# Patient Record
Sex: FEMALE | Race: OTHER | HISPANIC OR LATINO | URBAN - METROPOLITAN AREA
[De-identification: names, ages, dates, MRNs, and addresses within clinical notes are randomized per-mention and may not be internally consistent; named-entity substitution may affect disease eponyms.]

---

## 2017-05-18 ENCOUNTER — EMERGENCY (EMERGENCY)
Facility: HOSPITAL | Age: 42
LOS: 0 days | Discharge: HOME | End: 2017-05-18
Admitting: INTERNAL MEDICINE

## 2017-06-28 DIAGNOSIS — S09.90XA UNSPECIFIED INJURY OF HEAD, INITIAL ENCOUNTER: ICD-10-CM
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
## 2017-06-28 DIAGNOSIS — Z88.0 ALLERGY STATUS TO PENICILLIN: ICD-10-CM
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
## 2017-06-28 DIAGNOSIS — R51 HEADACHE: ICD-10-CM

## 2017-06-28 DIAGNOSIS — S00.81XA ABRASION OF OTHER PART OF HEAD, INITIAL ENCOUNTER: ICD-10-CM
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
## 2017-12-18 ENCOUNTER — EMERGENCY (EMERGENCY)
Facility: HOSPITAL | Age: 42
LOS: 0 days | Discharge: HOME | End: 2017-12-19
Admitting: INTERNAL MEDICINE

## 2017-12-18 DIAGNOSIS — Z88.0 ALLERGY STATUS TO PENICILLIN: ICD-10-CM

## 2017-12-18 DIAGNOSIS — R19.7 DIARRHEA, UNSPECIFIED: ICD-10-CM

## 2017-12-18 DIAGNOSIS — R11.0 NAUSEA: ICD-10-CM

## 2017-12-18 DIAGNOSIS — Z72.0 TOBACCO USE: ICD-10-CM

## 2017-12-18 DIAGNOSIS — R63.0 ANOREXIA: ICD-10-CM

## 2017-12-18 DIAGNOSIS — Z98.891 HISTORY OF UTERINE SCAR FROM PREVIOUS SURGERY: ICD-10-CM

## 2017-12-18 DIAGNOSIS — R10.13 EPIGASTRIC PAIN: ICD-10-CM

## 2017-12-18 DIAGNOSIS — R10.32 LEFT LOWER QUADRANT PAIN: ICD-10-CM

## 2018-08-20 ENCOUNTER — OUTPATIENT (OUTPATIENT)
Dept: OUTPATIENT SERVICES | Facility: HOSPITAL | Age: 43
LOS: 1 days | Discharge: HOME | End: 2018-08-20

## 2018-08-20 DIAGNOSIS — R10.30 LOWER ABDOMINAL PAIN, UNSPECIFIED: ICD-10-CM

## 2018-08-23 ENCOUNTER — EMERGENCY (EMERGENCY)
Facility: HOSPITAL | Age: 43
LOS: 1 days | Discharge: HOME | End: 2018-08-23
Attending: EMERGENCY MEDICINE

## 2018-08-23 VITALS
RESPIRATION RATE: 16 BRPM | TEMPERATURE: 99 F | DIASTOLIC BLOOD PRESSURE: 95 MMHG | OXYGEN SATURATION: 99 % | WEIGHT: 225.09 LBS | HEART RATE: 96 BPM | HEIGHT: 64 IN | SYSTOLIC BLOOD PRESSURE: 194 MMHG

## 2018-08-23 VITALS — SYSTOLIC BLOOD PRESSURE: 117 MMHG | DIASTOLIC BLOOD PRESSURE: 61 MMHG | TEMPERATURE: 97 F | HEART RATE: 87 BPM

## 2018-08-23 DIAGNOSIS — Z98.891 HISTORY OF UTERINE SCAR FROM PREVIOUS SURGERY: ICD-10-CM

## 2018-08-23 DIAGNOSIS — N94.9 UNSPECIFIED CONDITION ASSOCIATED WITH FEMALE GENITAL ORGANS AND MENSTRUAL CYCLE: ICD-10-CM

## 2018-08-23 DIAGNOSIS — R10.9 UNSPECIFIED ABDOMINAL PAIN: ICD-10-CM

## 2018-08-23 DIAGNOSIS — R19.7 DIARRHEA, UNSPECIFIED: ICD-10-CM

## 2018-08-23 DIAGNOSIS — N83.8 OTHER NONINFLAMMATORY DISORDERS OF OVARY, FALLOPIAN TUBE AND BROAD LIGAMENT: ICD-10-CM

## 2018-08-23 DIAGNOSIS — F17.200 NICOTINE DEPENDENCE, UNSPECIFIED, UNCOMPLICATED: ICD-10-CM

## 2018-08-23 LAB
ALBUMIN SERPL ELPH-MCNC: 4.2 G/DL — SIGNIFICANT CHANGE UP (ref 3.5–5.2)
ALP SERPL-CCNC: 108 U/L — SIGNIFICANT CHANGE UP (ref 30–115)
ALT FLD-CCNC: 7 U/L — SIGNIFICANT CHANGE UP (ref 0–41)
ANION GAP SERPL CALC-SCNC: 16 MMOL/L — HIGH (ref 7–14)
APPEARANCE UR: ABNORMAL
APTT BLD: 33.3 SEC — SIGNIFICANT CHANGE UP (ref 27–39.2)
AST SERPL-CCNC: 11 U/L — SIGNIFICANT CHANGE UP (ref 0–41)
BACTERIA # UR AUTO: ABNORMAL /HPF
BILIRUB SERPL-MCNC: 0.8 MG/DL — SIGNIFICANT CHANGE UP (ref 0.2–1.2)
BILIRUB UR-MCNC: NEGATIVE — SIGNIFICANT CHANGE UP
BUN SERPL-MCNC: 7 MG/DL — LOW (ref 10–20)
CALCIUM SERPL-MCNC: 9.5 MG/DL — SIGNIFICANT CHANGE UP (ref 8.5–10.1)
CHLORIDE SERPL-SCNC: 99 MMOL/L — SIGNIFICANT CHANGE UP (ref 98–110)
CO2 SERPL-SCNC: 25 MMOL/L — SIGNIFICANT CHANGE UP (ref 17–32)
COLOR SPEC: YELLOW — SIGNIFICANT CHANGE UP
CREAT SERPL-MCNC: 0.6 MG/DL — LOW (ref 0.7–1.5)
DIFF PNL FLD: NEGATIVE — SIGNIFICANT CHANGE UP
EPI CELLS # UR: ABNORMAL /HPF
GLUCOSE SERPL-MCNC: 152 MG/DL — HIGH (ref 70–99)
GLUCOSE UR QL: NEGATIVE MG/DL — SIGNIFICANT CHANGE UP
HCT VFR BLD CALC: 37.1 % — SIGNIFICANT CHANGE UP (ref 37–47)
HGB BLD-MCNC: 12.5 G/DL — SIGNIFICANT CHANGE UP (ref 12–16)
INR BLD: 1.29 RATIO — SIGNIFICANT CHANGE UP (ref 0.65–1.3)
KETONES UR-MCNC: NEGATIVE — SIGNIFICANT CHANGE UP
LACTATE SERPL-SCNC: 1.4 MMOL/L — SIGNIFICANT CHANGE UP (ref 0.5–2.2)
LEUKOCYTE ESTERASE UR-ACNC: NEGATIVE — SIGNIFICANT CHANGE UP
LIDOCAIN IGE QN: 15 U/L — SIGNIFICANT CHANGE UP (ref 7–60)
MCHC RBC-ENTMCNC: 28.1 PG — SIGNIFICANT CHANGE UP (ref 27–31)
MCHC RBC-ENTMCNC: 33.7 G/DL — SIGNIFICANT CHANGE UP (ref 32–37)
MCV RBC AUTO: 83.4 FL — SIGNIFICANT CHANGE UP (ref 81–99)
NITRITE UR-MCNC: NEGATIVE — SIGNIFICANT CHANGE UP
NRBC # BLD: 0 /100 WBCS — SIGNIFICANT CHANGE UP (ref 0–0)
PH UR: 6.5 — SIGNIFICANT CHANGE UP (ref 5–8)
PLATELET # BLD AUTO: 354 K/UL — SIGNIFICANT CHANGE UP (ref 130–400)
POTASSIUM SERPL-MCNC: 4 MMOL/L — SIGNIFICANT CHANGE UP (ref 3.5–5)
POTASSIUM SERPL-SCNC: 4 MMOL/L — SIGNIFICANT CHANGE UP (ref 3.5–5)
PROT SERPL-MCNC: 7.4 G/DL — SIGNIFICANT CHANGE UP (ref 6–8)
PROT UR-MCNC: NEGATIVE MG/DL — SIGNIFICANT CHANGE UP
PROTHROM AB SERPL-ACNC: 13.9 SEC — HIGH (ref 9.95–12.87)
RBC # BLD: 4.45 M/UL — SIGNIFICANT CHANGE UP (ref 4.2–5.4)
RBC # FLD: 11.9 % — SIGNIFICANT CHANGE UP (ref 11.5–14.5)
SODIUM SERPL-SCNC: 140 MMOL/L — SIGNIFICANT CHANGE UP (ref 135–146)
SP GR SPEC: 1.01 — SIGNIFICANT CHANGE UP (ref 1.01–1.03)
TYPE + AB SCN PNL BLD: SIGNIFICANT CHANGE UP
UROBILINOGEN FLD QL: 1 MG/DL (ref 0.2–0.2)
WBC # BLD: 9.52 K/UL — SIGNIFICANT CHANGE UP (ref 4.8–10.8)
WBC # FLD AUTO: 9.52 K/UL — SIGNIFICANT CHANGE UP (ref 4.8–10.8)

## 2018-08-23 RX ORDER — SODIUM CHLORIDE 9 MG/ML
1000 INJECTION INTRAMUSCULAR; INTRAVENOUS; SUBCUTANEOUS ONCE
Qty: 0 | Refills: 0 | Status: COMPLETED | OUTPATIENT
Start: 2018-08-23 | End: 2018-08-23

## 2018-08-23 RX ORDER — SODIUM CHLORIDE 9 MG/ML
3 INJECTION INTRAMUSCULAR; INTRAVENOUS; SUBCUTANEOUS ONCE
Qty: 0 | Refills: 0 | Status: COMPLETED | OUTPATIENT
Start: 2018-08-23 | End: 2018-08-23

## 2018-08-23 RX ADMIN — SODIUM CHLORIDE 3 MILLILITER(S): 9 INJECTION INTRAMUSCULAR; INTRAVENOUS; SUBCUTANEOUS at 06:44

## 2018-08-23 RX ADMIN — SODIUM CHLORIDE 1000 MILLILITER(S): 9 INJECTION INTRAMUSCULAR; INTRAVENOUS; SUBCUTANEOUS at 05:46

## 2018-08-23 NOTE — ED PROVIDER NOTE - MEDICAL DECISION MAKING DETAILS
Pt reassessed. Labs and imaging discussed at length with pt.  Advised close follow up with Dr. Wade in 1 day for reevaluation and pt agrees.  Return precautions advised and pt verbalized understanding.

## 2018-08-23 NOTE — CONSULT NOTE ADULT - ASSESSMENT
42 yo P2, PMH asthma, perimenopausal, abdominal bloating with CT findings suspicious of adnexal neoplasm, hemodynamically and clinically stable at this time. 44 yo P2, PMH asthma, perimenopausal, abdominal bloating with CT findings suspicious of adnexal neoplasm, TVUS showing heterogenous adnexal mass, hemodynamically and clinically stable at this time, not requiring acute GYN intervention

## 2018-08-23 NOTE — CONSULT NOTE ADULT - PROBLEM SELECTOR RECOMMENDATION 9
-Discussed findings with pt, Dr. Cardenas, Dr. Wade/Dr. Li and Dr. Pedraza. Given pt is stable with minimal pain, and is requesting outpatient follow up due to multiple social reasons, will discharge pt from ED after final read of CT and TVUS.   -Dr. Nuno to continue work up and draw tumour markers from the office.   -Pt to follow up tomorrow with Dr. Nuno in the office.  -Counseled on importance of follow up and continued work up.  -Precautions given    Discussed with Dr. Cardenas and Dr. Nuno -Discussed findings with pt, Dr. Cardenas, Dr. Wade/Dr. Li and Dr. Pedraza. Given pt is stable with minimal pain, and is requesting outpatient follow up due to multiple social reasons, will discharge pt from ED after final read of CT and TVUS.   -Dr. Nuno to continue work up and draw tumour markers from the office.   -Pt to follow up tomorrow with Dr. Nuno in the office.  -Counseled on importance of follow up and continued work up.  -Precautions given    Discussed with Dr. Cardenas and Dr. Nuno    ADDENDUM:  TVUS consistent with CT, no acute intervention required. Disposition per ED.

## 2018-08-23 NOTE — ED ADULT TRIAGE NOTE - CHIEF COMPLAINT QUOTE
"I have a lot of bloating in my stomach. My doctor wanted me to get a CT because the X-ray was negative."

## 2018-08-23 NOTE — ED PROVIDER NOTE - NS ED ROS FT
Except as documented in HPI, all other ROS negative.   GENERAL: Denies fever/chills, loss of appetite/weight or fatigue.  SKIN: Denies rashes, abrasions, lacerations, ecchymosis, erythema, or edema.  HEAD: Denies headache, dizziness or trauma.  CARDIAC: Denies chest pain, palpitations, or SOB.   RESPIRATORY: Denies SOB, cough, hemoptysis or wheezing.   GI: + abdominal pain/bloating.   : Denies hematuria, dysuria or frequency.   MSK: Denies myalgias, bony deformity or pain.   NEURO: Denies paresthesias, tingling, weakness.

## 2018-08-23 NOTE — ED ADULT NURSE NOTE - NSIMPLEMENTINTERV_GEN_ALL_ED
Implemented All Universal Safety Interventions:  [place] to call system. Call bell, personal items and telephone within reach. Instruct patient to call for assistance. Room bathroom lighting operational. Non-slip footwear when patient is off stretcher. Physically safe environment: no spills, clutter or unnecessary equipment. Stretcher in lowest position, wheels locked, appropriate side rails in place.

## 2018-08-23 NOTE — ED PROVIDER NOTE - OBJECTIVE STATEMENT
Pt is a 44 y/o Female, no PMHX, 2 c-sections in the past, presents to ED w/ abdominal pain & bloating. Pt reports symptoms began 6 days prior to arrival in the ED. On Monday, she went to her PMD who sent her for an outpatient XR to r/o obstruction, which she states she was told was negative. Pt was taking OTC gas relief medications with no relief. Pt endorses 1 week ago, had 1 episode of vomiting, 1 episode of diarrhea, but since has had none. She also reports last week got her menstrual period after 3 months of not getting it. Pt denies fever, chills, melena, dysuria, hematuria, bloody stools, bloody vomitus.

## 2018-08-23 NOTE — CONSULT NOTE ADULT - SUBJECTIVE AND OBJECTIVE BOX
[id number]    HPI: 44 yo P2, LMP 8/14 presents to ED with 6 day history of abdominal bloating. Pt states on Friday she noticed while putting on her seatbelt that her abdomen felt bigger. Reports mild abdominal pain with palpation only, not with resting change in position or eating. Reports 1 episode of LLQ, sharp pain on 8/14, 10/10, associated with 1 episode of vomiting, improved with lying down and motrin, and has not since had that pain.  OBhx  GYNhx  PMHx  PSHx    Physical Exam:  Vital Signs Last 24 Hrs  T(C): 36.8 (23 Aug 2018 08:45), Max: 37.1 (23 Aug 2018 04:58)  T(F): 98.2 (23 Aug 2018 08:45), Max: 98.8 (23 Aug 2018 04:58)  HR: 72 (23 Aug 2018 08:45) (72 - 96)  BP: 128/60 (23 Aug 2018 08:45) (128/60 - 194/95)  RR: 18 (23 Aug 2018 08:45) (16 - 18)  SpO2: 98% (23 Aug 2018 08:45) (98% - 99%)              Imaging    Assessment    Plan MICHAEL HERNANDEZ [id number]    HPI: 44 yo P2, LMP  presents to ED with 6 day history of abdominal bloating. Pt states on Friday she noticed while putting on her seatbelt that her abdomen felt bigger. Reports mild abdominal pain with palpation only, not with resting change in position or eating. Reports 1 episode of LLQ, sharp pain on , 10/10, associated with 1 episode of vomiting, improved with lying down and motrin, and has not since had that pain. Reports vaginal dryness, night sweats and tiredness since May. Good appetite, eating and drinking well. Denies weight changes, sick contacts, recent travel, fevers, chills, headache, CP, SOB, N/V/D, abdominal pain, dysuria, hematuria, vaginal discharge or bleeding, or LE swelling. Last intercourse in Feb, last BM yesterday, normal. Was seen by PMD on Monday, sent for Xray to rule out obstruction, no obstructive bowel gas pattern seen.     Pt has multiple social stressors at this time - brother committed suicide in , daughter attempted suicide in April. Reports having adequate support at home at this time.     OBhx: c/s x2 for CPD  GYNhx:  LMP , however only bled for 2 days. Prior menses was in May. Had regular menses until May. Last saw Dr. Nuno in  2017, reports normal pap at that time. Denies abnormal pap smears, fibroids, ovarian cysts, STI's. Has hx of genital warts s/p cryotherapy. 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    PMHx Asthma (never hospitalized or intubated)  Meds; Albuterol occasionally, 1-2x per year  PSHx: c/s x2, LN biopsy (normal)  Allergies: PCN - hives  SHx: e-cigarettes, occasional alcohol use, denies illicit drug use  FHx: Mother had hysterectomy in her 40s, for pre-cancerous cyst. Diagnosed with breast cancer in her 60s.    Physical Exam:  Vital Signs Last 24 Hrs  T(F): 98.2 (23 Aug 2018 08:45), Max: 98.8 (23 Aug 2018 04:58)  HR: 72 (23 Aug 2018 08:45) (72 - 96)  BP: 128/60 (23 Aug 2018 08:45) (128/60 - 194/95)  RR: 18 (23 Aug 2018 08:45) (16 - 18)  SpO2: 98% (23 Aug 2018 08:45) (98% - 99%)    Gen: NAD, AOOx3  CVS: RRR, normal S1, S2  Lungs: CTAB  Abd: obese, firm, non mobile mass palpated left of umbilicus, ranging from infraumbilical to subcostal region. Not palpated to extend to the pelvis. No R/G/R, no suprapubic or CVA tenderness.  Pelvic: Normal appearing external genitalia. Speculum exam revealed small amount of physiological discharge, no vaginal bleeding, no lesions. Bimanual exam - closed cervix, no masses palpated, uterus 8w size, anteverted, non tender, does note appear to be contiguous with abdominal mass. No tenderness. Fullness appreciated in L adnexa, non tender. No masses or tenderness in R adnexa  LE: no edeme or tenderness    Labs  O pos                        12.5   9.52  )-----------( 354      ( 23 Aug 2018 05:37 )             37.1     08    140  |  99  |  7<L>  ----------------------------<  152<H>  4.0   |  25  |  0.6<L>    Ca    9.5      23 Aug 2018 05:37    TPro  7.4  /  Alb  4.2  /  TBili  0.8  /  DBili  x   /  AST  11  /  ALT  7   /  AlkPhos  108      Lipase 15  Lactate 1.4    Urinalysis Basic - ( 23 Aug 2018 07:30 )    Color: Yellow / Appearance: Cloudy / S.010 / pH: x  Gluc: x / Ketone: Negative  / Bili: Negative / Urobili: 1.0 mg/dL   Blood: x / Protein: Negative mg/dL / Nitrite: Negative   Leuk Esterase: Negative / RBC: x / WBC x   Sq Epi: x / Non Sq Epi: Many /HPF / Bacteria: Moderate /HPF    PT/INR - ( 23 Aug 2018 09:11 )   PT: 13.90 sec;   INR: 1.29 ratio       PTT - ( 23 Aug 2018 09:11 )  PTT:33.3 sec    Meds  None    Imaging  < from: CT Abdomen and Pelvis w/ IV Cont (18 @ 08:52) >  KIDNEYS: A subcentimeter left renal interpolar hypodensity is too small   to further characterize (series 3, image 37). No hydronephrosis.    ABDOMINOPELVIC NODES: Few mildly enlarged retroperitoneal lymph nodes   measure up to 1.7 x 1.3 cm in the left para-aortic region (series 3,   image 47).     PELVIC ORGANS: A heterogeneous predominantly low density 13.6 x 10.0 x   15.1 cm leftadnexal mass is noted, suspicious for neoplasm.  Small free   pelvic fluid is noted.  Pelvic organs are otherwise unremarkable.    PERITONEUM/MESENTERY/BOWEL: Unremarkable.    BONES/SOFT TISSUES: No suspicious osseous lesion.    OTHER: Atherosclerotic vascular calcification.        IMPRESSION:     1. Heterogeneous 13.6 x 10.0 x 15.1 cm left adnexal mass, suspicious for   neoplasm; GYN consultation is advised.    2. Few mildly enlarged retroperitoneal lymph nodes.    3. Small free pelvic fluid.      Findings were discussed with Dr. Caraballo at 9:05AM on 2018,   with readback.     < end of copied text >    TVUS pending MICHAEL HERNANDEZ [id number]    HPI: 44 yo P2, LMP  presents to ED with 6 day history of abdominal bloating. Pt states on Friday she noticed while putting on her seatbelt that her abdomen felt bigger. Reports mild abdominal pain with palpation only, not with resting change in position or eating. Reports 1 episode of LLQ, sharp pain on , 10/10, associated with 1 episode of vomiting, improved with lying down and motrin, and has not since had that pain. Reports vaginal dryness, night sweats and tiredness since May. Good appetite, eating and drinking well. Denies weight changes, sick contacts, recent travel, fevers, chills, headache, CP, SOB, N/V/D, abdominal pain, dysuria, hematuria, vaginal discharge or bleeding, or LE swelling. Last intercourse in Feb, last BM yesterday, normal. Was seen by PMD on Monday, sent for Xray to rule out obstruction, no obstructive bowel gas pattern seen.     Pt has multiple social stressors at this time - brother committed suicide in , daughter attempted suicide in April. Reports having adequate support at home at this time.     OBhx: c/s x2 for CPD  GYNhx:  LMP , however only bled for 2 days. Prior menses was in May. Had regular menses until May. Last saw Dr. Nuno in  2017, reports normal pap at that time. Denies abnormal pap smears, fibroids, ovarian cysts, STI's. Has hx of genital warts s/p cryotherapy. 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    PMHx Asthma (never hospitalized or intubated)  Meds; Albuterol occasionally, 1-2x per year  PSHx: c/s x2, LN biopsy (normal)  Allergies: PCN - hives  SHx: e-cigarettes, occasional alcohol use, denies illicit drug use  FHx: Mother had hysterectomy in her 40s, for pre-cancerous cyst. Diagnosed with breast cancer in her 60s.    Physical Exam:  Vital Signs Last 24 Hrs  T(F): 98.2 (23 Aug 2018 08:45), Max: 98.8 (23 Aug 2018 04:58)  HR: 72 (23 Aug 2018 08:45) (72 - 96)  BP: 128/60 (23 Aug 2018 08:45) (128/60 - 194/95)  RR: 18 (23 Aug 2018 08:45) (16 - 18)  SpO2: 98% (23 Aug 2018 08:45) (98% - 99%)    Gen: NAD, AOOx3  CVS: RRR, normal S1, S2  Lungs: CTAB  Abd: obese, firm, non mobile mass palpated left of umbilicus, ranging from infraumbilical to subcostal region. Not palpated to extend to the pelvis. No R/G/R, no suprapubic or CVA tenderness.  Pelvic: Normal appearing external genitalia. Speculum exam revealed small amount of physiological discharge, no vaginal bleeding, no lesions. Bimanual exam - closed cervix, no masses palpated, uterus 8w size, anteverted, non tender, does note appear to be contiguous with abdominal mass. No tenderness. Fullness appreciated in L adnexa, non tender. No masses or tenderness in R adnexa  LE: no edeme or tenderness    Labs  O pos                        12.5   9.52  )-----------( 354      ( 23 Aug 2018 05:37 )             37.1     08    140  |  99  |  7<L>  ----------------------------<  152<H>  4.0   |  25  |  0.6<L>    Ca    9.5      23 Aug 2018 05:37    TPro  7.4  /  Alb  4.2  /  TBili  0.8  /  DBili  x   /  AST  11  /  ALT  7   /  AlkPhos  108      Lipase 15  Lactate 1.4    Urinalysis Basic - ( 23 Aug 2018 07:30 )    Color: Yellow / Appearance: Cloudy / S.010 / pH: x  Gluc: x / Ketone: Negative  / Bili: Negative / Urobili: 1.0 mg/dL   Blood: x / Protein: Negative mg/dL / Nitrite: Negative   Leuk Esterase: Negative / RBC: x / WBC x   Sq Epi: x / Non Sq Epi: Many /HPF / Bacteria: Moderate /HPF    PT/INR - ( 23 Aug 2018 09:11 )   PT: 13.90 sec;   INR: 1.29 ratio       PTT - ( 23 Aug 2018 09:11 )  PTT:33.3 sec    Meds  None    Imaging  < from: CT Abdomen and Pelvis w/ IV Cont (18 @ 08:52) >  KIDNEYS: A subcentimeter left renal interpolar hypodensity is too small   to further characterize (series 3, image 37). No hydronephrosis.    ABDOMINOPELVIC NODES: Few mildly enlarged retroperitoneal lymph nodes   measure up to 1.7 x 1.3 cm in the left para-aortic region (series 3,   image 47).     PELVIC ORGANS: A heterogeneous predominantly low density 13.6 x 10.0 x   15.1 cm leftadnexal mass is noted, suspicious for neoplasm.  Small free   pelvic fluid is noted.  Pelvic organs are otherwise unremarkable.    PERITONEUM/MESENTERY/BOWEL: Unremarkable.    BONES/SOFT TISSUES: No suspicious osseous lesion.    OTHER: Atherosclerotic vascular calcification.        IMPRESSION:     1. Heterogeneous 13.6 x 10.0 x 15.1 cm left adnexal mass, suspicious for   neoplasm; GYN consultation is advised.    2. Few mildly enlarged retroperitoneal lymph nodes.    3. Small free pelvic fluid.      Findings were discussed with Dr. Caraballo at 9:05AM on 2018,   with readback.     < end of copied text >    TVUS  < from: US Transvaginal (18 @ 12:26) >  FINDINGS:    UTERUS: Measuring 7.1 cm, with normal echogenicity and morphology. The   endometrial echo complex measures 0.56 cm, which is within normal limits.     RIGHT OVARY: Not visualized.    LEFT OVARY: Contains a large heterogeneous adnexal mass measuring 12.4 x   11 x 9.2 cm.    OTHER: There is trace free fluid in the pelvis.    IMPRESSION:    Large heterogeneous adnexal mass measuring up to 12.4 cm, unchanged from   CT scan 3 hours prior.    < end of copied text >

## 2018-08-23 NOTE — ED PROVIDER NOTE - PROGRESS NOTE DETAILS
Pt s/o to me by Dr. Abdi, will follow up UA, imaging, reassess and dispo. Pt endorsed to Dr. Pedraza Sign out received from CANDI Dunlap. Patient resting comfortably. No change in symptoms. CT pending. Will reassess. OB at bedside CT scan results discussed with pt and GYN consulted, came to evaluate her in ED and are discussing further care with her GYN Dr. Li.

## 2018-08-23 NOTE — ED PROVIDER NOTE - ATTENDING CONTRIBUTION TO CARE
I personally evaluated the patient. I reviewed the Resident’s or Physician Assistant’s note (as assigned above), and agree with the findings and plan except as documented in my note.  43 F with no significant pmhx, pshx of c section X2, last 13 years ago, +smoker with complaints of 6 days of abdominal bloating and left sided abd pain. Pt was seen by PMD Dr. Bailey who sent her for outpt obstructive series and recommended gas-ex. Pt reports taking medication daily for the past 6 days w/o improvement. Xray w/o acute findings. Pt was informed that if she continues to be symptomatic to present to ED. Pt reports that bloating and LLQ abd pain worsened. No f/c/n/v, no urinary complaints. LMP 1 week ago, its currently irregular. VS reviewed, pt NAD. Head ncat, neck supple, no JVD, normal s1s2 without any murmurs, Lungs CTAB with normal work of breathing. abd +BS, s/nd, + left sided abd pain, no guarding or rebound, extremities wnl, neuro exam grossly normal. No acute skin rashes. Plan is labs, abd imaging and reassess.

## 2018-08-23 NOTE — ED ADULT NURSE REASSESSMENT NOTE - NS ED NURSE REASSESS COMMENT FT1
Pt received from LINDSEY wynn, alert and oriented times three, breathing spontaneous, unlabored without distress on room air, NAD, denies abd pain, N/V/D, awaits CT scan

## 2018-08-23 NOTE — ED ADULT NURSE NOTE - OBJECTIVE STATEMENT
pt is a 42 yo female co abd pain to LLQ and UMQ with decreased appetite, and bloating. pt denies n/v/d/ha/blurred vis/diz/cp. pt sates went to PMD for this pain, xray was (-). and was told if the pain worsened to come to ED.

## 2018-08-23 NOTE — ED PROVIDER NOTE - PHYSICAL EXAMINATION
VITAL SIGNS: I have reviewed the initial vital signs.   CONSTITUTIONAL: Awake, alert. Well-developed; well-nourished; in no distress. Non-toxic appearing.   SKIN: No rash, vesicles/lesion, abrasions or lacerations. No ecchymosis or signs of trauma.   HEAD: Normocephalic; atraumatic.   EYES: Symmetrical, no discharge or signs of trauma. Conjunctiva and sclera clear.   CARD: No chest wall deformity or tenderness. S1, S2 normal; no murmurs, gallops, or rubs. Regular rate and rhythm.  RESP: Good air movement. Lungs CTAB. No crackles, wheezes, rales or rhonchi.  ABD: Soft; non-distended; + supraumbilical tenderness & LLQ tenderness. No CVA tenderness.   NEURO: A&Ox3. GCS 15. Normal speech.   PSYCH: Cooperative, appropriate.

## 2018-08-29 ENCOUNTER — OUTPATIENT (OUTPATIENT)
Dept: OUTPATIENT SERVICES | Facility: HOSPITAL | Age: 43
LOS: 1 days | Discharge: HOME | End: 2018-08-29

## 2018-08-29 DIAGNOSIS — R06.9 UNSPECIFIED ABNORMALITIES OF BREATHING: ICD-10-CM

## 2018-09-07 ENCOUNTER — OUTPATIENT (OUTPATIENT)
Dept: OUTPATIENT SERVICES | Facility: HOSPITAL | Age: 43
LOS: 1 days | Discharge: HOME | End: 2018-09-07

## 2018-09-07 ENCOUNTER — APPOINTMENT (OUTPATIENT)
Dept: GYNECOLOGIC ONCOLOGY | Facility: CLINIC | Age: 43
End: 2018-09-07

## 2018-09-07 VITALS
RESPIRATION RATE: 14 BRPM | BODY MASS INDEX: 37.39 KG/M2 | SYSTOLIC BLOOD PRESSURE: 138 MMHG | HEIGHT: 64 IN | TEMPERATURE: 98.6 F | HEART RATE: 98 BPM | WEIGHT: 219 LBS | DIASTOLIC BLOOD PRESSURE: 85 MMHG

## 2018-09-07 DIAGNOSIS — Z80.3 FAMILY HISTORY OF MALIGNANT NEOPLASM OF BREAST: ICD-10-CM

## 2018-09-07 DIAGNOSIS — F17.200 NICOTINE DEPENDENCE, UNSPECIFIED, UNCOMPLICATED: ICD-10-CM

## 2018-09-07 DIAGNOSIS — Z86.19 PERSONAL HISTORY OF OTHER INFECTIOUS AND PARASITIC DISEASES: ICD-10-CM

## 2018-09-07 DIAGNOSIS — J45.909 UNSPECIFIED ASTHMA, UNCOMPLICATED: ICD-10-CM

## 2018-09-07 DIAGNOSIS — Z86.018 PERSONAL HISTORY OF OTHER BENIGN NEOPLASM: ICD-10-CM

## 2018-09-07 RX ORDER — ALBUTEROL 90 MCG
AEROSOL (GRAM) INHALATION
Refills: 0 | Status: ACTIVE | COMMUNITY

## 2018-09-11 DIAGNOSIS — N83.209 UNSPECIFIED OVARIAN CYST, UNSPECIFIED SIDE: ICD-10-CM

## 2018-10-01 ENCOUNTER — OUTPATIENT (OUTPATIENT)
Dept: OUTPATIENT SERVICES | Facility: HOSPITAL | Age: 43
LOS: 1 days | Discharge: HOME | End: 2018-10-01

## 2018-10-01 VITALS
RESPIRATION RATE: 20 BRPM | OXYGEN SATURATION: 100 % | WEIGHT: 220.02 LBS | HEART RATE: 84 BPM | TEMPERATURE: 97 F | SYSTOLIC BLOOD PRESSURE: 160 MMHG | DIASTOLIC BLOOD PRESSURE: 80 MMHG | HEIGHT: 64 IN

## 2018-10-01 DIAGNOSIS — R59.0 LOCALIZED ENLARGED LYMPH NODES: Chronic | ICD-10-CM

## 2018-10-01 DIAGNOSIS — N83.9 NONINFLAMMATORY DISORDER OF OVARY, FALLOPIAN TUBE AND BROAD LIGAMENT, UNSPECIFIED: ICD-10-CM

## 2018-10-01 DIAGNOSIS — Z01.818 ENCOUNTER FOR OTHER PREPROCEDURAL EXAMINATION: ICD-10-CM

## 2018-10-01 DIAGNOSIS — Z98.891 HISTORY OF UTERINE SCAR FROM PREVIOUS SURGERY: Chronic | ICD-10-CM

## 2018-10-01 LAB
ALBUMIN SERPL ELPH-MCNC: 4.3 G/DL — SIGNIFICANT CHANGE UP (ref 3.5–5.2)
ALP SERPL-CCNC: 108 U/L — SIGNIFICANT CHANGE UP (ref 30–115)
ALT FLD-CCNC: 6 U/L — SIGNIFICANT CHANGE UP (ref 0–41)
ANION GAP SERPL CALC-SCNC: 17 MMOL/L — HIGH (ref 7–14)
APPEARANCE UR: ABNORMAL
APTT BLD: 34.4 SEC — SIGNIFICANT CHANGE UP (ref 27–39.2)
AST SERPL-CCNC: 10 U/L — SIGNIFICANT CHANGE UP (ref 0–41)
BASOPHILS # BLD AUTO: 0.03 K/UL — SIGNIFICANT CHANGE UP (ref 0–0.2)
BASOPHILS NFR BLD AUTO: 0.3 % — SIGNIFICANT CHANGE UP (ref 0–1)
BILIRUB SERPL-MCNC: 0.5 MG/DL — SIGNIFICANT CHANGE UP (ref 0.2–1.2)
BILIRUB UR-MCNC: NEGATIVE — SIGNIFICANT CHANGE UP
BLD GP AB SCN SERPL QL: SIGNIFICANT CHANGE UP
BUN SERPL-MCNC: 7 MG/DL — LOW (ref 10–20)
CALCIUM SERPL-MCNC: 9.3 MG/DL — SIGNIFICANT CHANGE UP (ref 8.5–10.1)
CHLORIDE SERPL-SCNC: 98 MMOL/L — SIGNIFICANT CHANGE UP (ref 98–110)
CO2 SERPL-SCNC: 25 MMOL/L — SIGNIFICANT CHANGE UP (ref 17–32)
COD CRY URNS QL: ABNORMAL /HPF
COLOR SPEC: YELLOW — SIGNIFICANT CHANGE UP
CREAT SERPL-MCNC: 0.6 MG/DL — LOW (ref 0.7–1.5)
DIFF PNL FLD: NEGATIVE — SIGNIFICANT CHANGE UP
EOSINOPHIL # BLD AUTO: 0.17 K/UL — SIGNIFICANT CHANGE UP (ref 0–0.7)
EOSINOPHIL NFR BLD AUTO: 1.9 % — SIGNIFICANT CHANGE UP (ref 0–8)
EPI CELLS # UR: ABNORMAL /HPF
GLUCOSE SERPL-MCNC: 117 MG/DL — HIGH (ref 70–99)
GLUCOSE UR QL: NEGATIVE MG/DL — SIGNIFICANT CHANGE UP
HCT VFR BLD CALC: 33.8 % — LOW (ref 37–47)
HGB BLD-MCNC: 10.8 G/DL — LOW (ref 12–16)
IMM GRANULOCYTES NFR BLD AUTO: 0.3 % — SIGNIFICANT CHANGE UP (ref 0.1–0.3)
INR BLD: 1.26 RATIO — SIGNIFICANT CHANGE UP (ref 0.65–1.3)
KETONES UR-MCNC: NEGATIVE — SIGNIFICANT CHANGE UP
LEUKOCYTE ESTERASE UR-ACNC: NEGATIVE — SIGNIFICANT CHANGE UP
LYMPHOCYTES # BLD AUTO: 1.94 K/UL — SIGNIFICANT CHANGE UP (ref 1.2–3.4)
LYMPHOCYTES # BLD AUTO: 22.2 % — SIGNIFICANT CHANGE UP (ref 20.5–51.1)
MCHC RBC-ENTMCNC: 27.3 PG — SIGNIFICANT CHANGE UP (ref 27–31)
MCHC RBC-ENTMCNC: 32 G/DL — SIGNIFICANT CHANGE UP (ref 32–37)
MCV RBC AUTO: 85.4 FL — SIGNIFICANT CHANGE UP (ref 81–99)
MONOCYTES # BLD AUTO: 0.6 K/UL — SIGNIFICANT CHANGE UP (ref 0.1–0.6)
MONOCYTES NFR BLD AUTO: 6.9 % — SIGNIFICANT CHANGE UP (ref 1.7–9.3)
NEUTROPHILS # BLD AUTO: 5.97 K/UL — SIGNIFICANT CHANGE UP (ref 1.4–6.5)
NEUTROPHILS NFR BLD AUTO: 68.4 % — SIGNIFICANT CHANGE UP (ref 42.2–75.2)
NITRITE UR-MCNC: NEGATIVE — SIGNIFICANT CHANGE UP
NRBC # BLD: 0 /100 WBCS — SIGNIFICANT CHANGE UP (ref 0–0)
PH UR: 6 — SIGNIFICANT CHANGE UP (ref 5–8)
PLATELET # BLD AUTO: 345 K/UL — SIGNIFICANT CHANGE UP (ref 130–400)
POTASSIUM SERPL-MCNC: 4.1 MMOL/L — SIGNIFICANT CHANGE UP (ref 3.5–5)
POTASSIUM SERPL-SCNC: 4.1 MMOL/L — SIGNIFICANT CHANGE UP (ref 3.5–5)
PROT SERPL-MCNC: 7.9 G/DL — SIGNIFICANT CHANGE UP (ref 6–8)
PROT UR-MCNC: NEGATIVE MG/DL — SIGNIFICANT CHANGE UP
PROTHROM AB SERPL-ACNC: 13.6 SEC — HIGH (ref 9.95–12.87)
RBC # BLD: 3.96 M/UL — LOW (ref 4.2–5.4)
RBC # FLD: 12.6 % — SIGNIFICANT CHANGE UP (ref 11.5–14.5)
RBC CASTS # UR COMP ASSIST: SIGNIFICANT CHANGE UP /HPF
SODIUM SERPL-SCNC: 140 MMOL/L — SIGNIFICANT CHANGE UP (ref 135–146)
SP GR SPEC: 1.02 — SIGNIFICANT CHANGE UP (ref 1.01–1.03)
TYPE + AB SCN PNL BLD: SIGNIFICANT CHANGE UP
UROBILINOGEN FLD QL: 0.2 MG/DL — SIGNIFICANT CHANGE UP (ref 0.2–0.2)
WBC # BLD: 8.74 K/UL — SIGNIFICANT CHANGE UP (ref 4.8–10.8)
WBC # FLD AUTO: 8.74 K/UL — SIGNIFICANT CHANGE UP (ref 4.8–10.8)
WBC UR QL: SIGNIFICANT CHANGE UP /HPF

## 2018-10-01 NOTE — H&P PST ADULT - FAMILY HISTORY
Father  Still living? No  CAD (coronary artery disease) of bypass graft, Age at diagnosis: Age Unknown  Family history of dementia, Age at diagnosis: Age Unknown  HTN (hypertension), Age at diagnosis: Age Unknown     Mother  Still living? Yes, Estimated age: 71-80  Breast cancer, Age at diagnosis: Age Unknown

## 2018-10-01 NOTE — H&P PST ADULT - HISTORY OF PRESENT ILLNESS
44 y/o female scheduled  for robotic hysterectomy  reports h/o ovarian mass advised to have above  reports no c/o cp,sob,palpitations,cough or dysuria  1-2 fos without sob

## 2018-10-15 ENCOUNTER — RESULT REVIEW (OUTPATIENT)
Age: 43
End: 2018-10-15

## 2018-10-15 ENCOUNTER — INPATIENT (INPATIENT)
Facility: HOSPITAL | Age: 43
LOS: 2 days | Discharge: HOME HEALTH | End: 2018-10-18
Attending: OBSTETRICS & GYNECOLOGY | Admitting: OBSTETRICS & GYNECOLOGY

## 2018-10-15 VITALS
RESPIRATION RATE: 20 BRPM | HEART RATE: 109 BPM | HEIGHT: 64 IN | SYSTOLIC BLOOD PRESSURE: 174 MMHG | DIASTOLIC BLOOD PRESSURE: 89 MMHG | WEIGHT: 220.02 LBS | TEMPERATURE: 98 F

## 2018-10-15 DIAGNOSIS — Z98.891 HISTORY OF UTERINE SCAR FROM PREVIOUS SURGERY: Chronic | ICD-10-CM

## 2018-10-15 DIAGNOSIS — R59.0 LOCALIZED ENLARGED LYMPH NODES: Chronic | ICD-10-CM

## 2018-10-15 LAB
ANION GAP SERPL CALC-SCNC: 16 MMOL/L — HIGH (ref 7–14)
BUN SERPL-MCNC: 6 MG/DL — LOW (ref 10–20)
CALCIUM SERPL-MCNC: 8.9 MG/DL — SIGNIFICANT CHANGE UP (ref 8.5–10.1)
CHLORIDE SERPL-SCNC: 100 MMOL/L — SIGNIFICANT CHANGE UP (ref 98–110)
CO2 SERPL-SCNC: 24 MMOL/L — SIGNIFICANT CHANGE UP (ref 17–32)
CREAT SERPL-MCNC: 0.5 MG/DL — LOW (ref 0.7–1.5)
GLUCOSE BLDC GLUCOMTR-MCNC: 184 MG/DL — HIGH (ref 70–99)
GLUCOSE SERPL-MCNC: 118 MG/DL — HIGH (ref 70–99)
HCT VFR BLD CALC: 31.2 % — LOW (ref 37–47)
HGB BLD-MCNC: 10.1 G/DL — LOW (ref 12–16)
MAGNESIUM SERPL-MCNC: 1.8 MG/DL — SIGNIFICANT CHANGE UP (ref 1.8–2.4)
MCHC RBC-ENTMCNC: 27.6 PG — SIGNIFICANT CHANGE UP (ref 27–31)
MCHC RBC-ENTMCNC: 32.4 G/DL — SIGNIFICANT CHANGE UP (ref 32–37)
MCV RBC AUTO: 85.2 FL — SIGNIFICANT CHANGE UP (ref 81–99)
NRBC # BLD: 0 /100 WBCS — SIGNIFICANT CHANGE UP (ref 0–0)
PHOSPHATE SERPL-MCNC: 4.1 MG/DL — SIGNIFICANT CHANGE UP (ref 2.1–4.9)
PLATELET # BLD AUTO: 339 K/UL — SIGNIFICANT CHANGE UP (ref 130–400)
POTASSIUM SERPL-MCNC: 4.1 MMOL/L — SIGNIFICANT CHANGE UP (ref 3.5–5)
POTASSIUM SERPL-SCNC: 4.1 MMOL/L — SIGNIFICANT CHANGE UP (ref 3.5–5)
RBC # BLD: 3.66 M/UL — LOW (ref 4.2–5.4)
RBC # FLD: 13 % — SIGNIFICANT CHANGE UP (ref 11.5–14.5)
SODIUM SERPL-SCNC: 140 MMOL/L — SIGNIFICANT CHANGE UP (ref 135–146)
WBC # BLD: 9.99 K/UL — SIGNIFICANT CHANGE UP (ref 4.8–10.8)
WBC # FLD AUTO: 9.99 K/UL — SIGNIFICANT CHANGE UP (ref 4.8–10.8)

## 2018-10-15 RX ORDER — DOCUSATE SODIUM 100 MG
100 CAPSULE ORAL
Qty: 0 | Refills: 0 | Status: DISCONTINUED | OUTPATIENT
Start: 2018-10-15 | End: 2018-10-18

## 2018-10-15 RX ORDER — DEXAMETHASONE 0.5 MG/5ML
4 ELIXIR ORAL ONCE
Qty: 0 | Refills: 0 | Status: DISCONTINUED | OUTPATIENT
Start: 2018-10-15 | End: 2018-10-15

## 2018-10-15 RX ORDER — FAMOTIDINE 10 MG/ML
20 INJECTION INTRAVENOUS
Qty: 0 | Refills: 0 | Status: DISCONTINUED | OUTPATIENT
Start: 2018-10-15 | End: 2018-10-18

## 2018-10-15 RX ORDER — ACETAMINOPHEN 500 MG
650 TABLET ORAL EVERY 6 HOURS
Qty: 0 | Refills: 0 | Status: DISCONTINUED | OUTPATIENT
Start: 2018-10-15 | End: 2018-10-18

## 2018-10-15 RX ORDER — HYDROMORPHONE HYDROCHLORIDE 2 MG/ML
1 INJECTION INTRAMUSCULAR; INTRAVENOUS; SUBCUTANEOUS
Qty: 0 | Refills: 0 | Status: DISCONTINUED | OUTPATIENT
Start: 2018-10-15 | End: 2018-10-15

## 2018-10-15 RX ORDER — OXYCODONE AND ACETAMINOPHEN 5; 325 MG/1; MG/1
1 TABLET ORAL EVERY 4 HOURS
Qty: 0 | Refills: 0 | Status: DISCONTINUED | OUTPATIENT
Start: 2018-10-15 | End: 2018-10-18

## 2018-10-15 RX ORDER — IBUPROFEN 200 MG
600 TABLET ORAL EVERY 6 HOURS
Qty: 0 | Refills: 0 | Status: DISCONTINUED | OUTPATIENT
Start: 2018-10-15 | End: 2018-10-18

## 2018-10-15 RX ORDER — FAMOTIDINE 10 MG/ML
20 INJECTION INTRAVENOUS DAILY
Qty: 0 | Refills: 0 | Status: DISCONTINUED | OUTPATIENT
Start: 2018-10-15 | End: 2018-10-15

## 2018-10-15 RX ORDER — SODIUM CHLORIDE 9 MG/ML
1000 INJECTION, SOLUTION INTRAVENOUS
Qty: 0 | Refills: 0 | Status: DISCONTINUED | OUTPATIENT
Start: 2018-10-15 | End: 2018-10-15

## 2018-10-15 RX ORDER — MEPERIDINE HYDROCHLORIDE 50 MG/ML
12.5 INJECTION INTRAMUSCULAR; INTRAVENOUS; SUBCUTANEOUS
Qty: 0 | Refills: 0 | Status: DISCONTINUED | OUTPATIENT
Start: 2018-10-15 | End: 2018-10-15

## 2018-10-15 RX ORDER — ONDANSETRON 8 MG/1
4 TABLET, FILM COATED ORAL ONCE
Qty: 0 | Refills: 0 | Status: DISCONTINUED | OUTPATIENT
Start: 2018-10-15 | End: 2018-10-15

## 2018-10-15 RX ORDER — SODIUM CHLORIDE 9 MG/ML
1000 INJECTION, SOLUTION INTRAVENOUS
Qty: 0 | Refills: 0 | Status: DISCONTINUED | OUTPATIENT
Start: 2018-10-15 | End: 2018-10-18

## 2018-10-15 RX ORDER — HYDROMORPHONE HYDROCHLORIDE 2 MG/ML
0.5 INJECTION INTRAMUSCULAR; INTRAVENOUS; SUBCUTANEOUS
Qty: 0 | Refills: 0 | Status: DISCONTINUED | OUTPATIENT
Start: 2018-10-15 | End: 2018-10-15

## 2018-10-15 RX ORDER — METOCLOPRAMIDE HCL 10 MG
10 TABLET ORAL EVERY 6 HOURS
Qty: 0 | Refills: 0 | Status: DISCONTINUED | OUTPATIENT
Start: 2018-10-15 | End: 2018-10-18

## 2018-10-15 RX ORDER — SIMETHICONE 80 MG/1
80 TABLET, CHEWABLE ORAL THREE TIMES A DAY
Qty: 0 | Refills: 0 | Status: DISCONTINUED | OUTPATIENT
Start: 2018-10-15 | End: 2018-10-18

## 2018-10-15 RX ORDER — ONDANSETRON 8 MG/1
4 TABLET, FILM COATED ORAL EVERY 6 HOURS
Qty: 0 | Refills: 0 | Status: DISCONTINUED | OUTPATIENT
Start: 2018-10-15 | End: 2018-10-15

## 2018-10-15 RX ORDER — HYDROMORPHONE HYDROCHLORIDE 2 MG/ML
30 INJECTION INTRAMUSCULAR; INTRAVENOUS; SUBCUTANEOUS
Qty: 0 | Refills: 0 | Status: DISCONTINUED | OUTPATIENT
Start: 2018-10-15 | End: 2018-10-16

## 2018-10-15 RX ORDER — OXYCODONE AND ACETAMINOPHEN 5; 325 MG/1; MG/1
2 TABLET ORAL EVERY 6 HOURS
Qty: 0 | Refills: 0 | Status: DISCONTINUED | OUTPATIENT
Start: 2018-10-15 | End: 2018-10-18

## 2018-10-15 RX ADMIN — SIMETHICONE 80 MILLIGRAM(S): 80 TABLET, CHEWABLE ORAL at 15:27

## 2018-10-15 RX ADMIN — SIMETHICONE 80 MILLIGRAM(S): 80 TABLET, CHEWABLE ORAL at 22:48

## 2018-10-15 RX ADMIN — FAMOTIDINE 20 MILLIGRAM(S): 10 INJECTION INTRAVENOUS at 18:39

## 2018-10-15 RX ADMIN — Medication 600 MILLIGRAM(S): at 23:50

## 2018-10-15 RX ADMIN — Medication 600 MILLIGRAM(S): at 18:40

## 2018-10-15 RX ADMIN — HYDROMORPHONE HYDROCHLORIDE 30 MILLILITER(S): 2 INJECTION INTRAMUSCULAR; INTRAVENOUS; SUBCUTANEOUS at 12:29

## 2018-10-15 RX ADMIN — Medication 100 MILLIGRAM(S): at 18:39

## 2018-10-15 RX ADMIN — SODIUM CHLORIDE 125 MILLILITER(S): 9 INJECTION, SOLUTION INTRAVENOUS at 12:15

## 2018-10-15 RX ADMIN — HYDROMORPHONE HYDROCHLORIDE 1 MILLIGRAM(S): 2 INJECTION INTRAMUSCULAR; INTRAVENOUS; SUBCUTANEOUS at 12:15

## 2018-10-15 RX ADMIN — Medication 100 MILLIGRAM(S): at 14:00

## 2018-10-15 RX ADMIN — Medication 100 MILLIGRAM(S): at 22:50

## 2018-10-15 NOTE — PRE-ANESTHESIA EVALUATION ADULT - NSANTHPMHFT_GEN_ALL_CORE
unable to state last asthma attack, been many years  obese    medicine- cleared, optimized for surgery
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
## 2018-10-15 NOTE — BRIEF OPERATIVE NOTE - PROCEDURE
<<-----Click on this checkbox to enter Procedure Pelvic lymph node dissection  10/15/2018    Active  ANIEDERMAI  Paraaortic node dissection  10/15/2018    Active  ANIEDERMAI  Appendectomy  10/15/2018    Active  ANIEDERMAI  Omentectomy  10/15/2018    Active  ANIEDERMAI  Total abdominal hysterectomy and bilateral salpingo-oophorectomy  10/15/2018    Active  ANIEDERMAI

## 2018-10-15 NOTE — PATIENT PROFILE ADULT - VISION (WITH CORRECTIVE LENSES IF THE PATIENT USUALLY WEARS THEM):
[facility]  [address]  [place] [address]  Phone: [phone]  Fax: [phone]                  Tha Guzman   3/22/2017 8:00 AM   Office Visit    Description:  Male : 1942   Provider:  Mindi Mar MD   Department:  [facility]           Reason for Visit     Follow-up           Diagnoses this Visit        Comments    Essential hypertension    -  Primary     Hyperlipidemia, mixed         Primary osteoarthritis of both knees         Immunization due         Hyperlipidemia, unspecified hyperlipidemia type                To Do List           Future Appointments        Provider Department Dept Phone    3/22/2017 9:15 AM LAB [facility] [place] [facility] - Lab [phone]    2017 8:00 AM Mindi Mar MD [facility] [phone]      Goals (5 Years of Data)     None      Follow-Up and Disposition     Return in about 6 months (around 2017) for HTN.       These Medications        Disp Refills Start End    amlodipine-benazepril 5-20 mg (LOTREL) 5-20 mg per capsule 90 capsule 3 3/22/2017     Take 1 capsule by mouth once daily. - Oral    Pharmacy: [facility]/pharmacy #[id number] - [place] - [address] Ph #: [phone]       simvastatin (ZOCOR) 20 MG tablet 90 tablet 3 3/22/2017     Take 1 tablet (20 mg total) by mouth every evening. - Oral    Pharmacy: [facility]/pharmacy #[id number] - [place] - [address] Ph #: [phone]       hydrocodone-acetaminophen 7.5-325mg (NORCO) 7.5-325 mg per tablet 90 tablet 0 3/22/2017     1 tablet PO TID prn severe pain    Pharmacy: [facility]/pharmacy #[id number] - [place] - [address] Ph #: [phone]         [facility] On Call     [facility] On Call Nurse Care Line -  Assistance  Registered nurses in the [facility] provide clinical advisement, health education, appointment booking, and other advisory services.  Call for this free service at [phone].             Medications            Message regarding Medications     Verify the changes and/or additions to your medication regime listed below are the same as discussed with your clinician today.  If any of these changes or additions are incorrect, please notify your healthcare provider.        CHANGE how you are taking these medications     Start Taking Instead of    simvastatin (ZOCOR) 20 MG tablet simvastatin (ZOCOR) 20 MG tablet    Dosage:  Take 1 tablet (20 mg total) by mouth every evening. Dosage:  Take 1 tablet (20 mg total) by mouth once daily.    Reason for Change:  Reorder     hydrocodone-acetaminophen 7.5-325mg (NORCO) 7.5-325 mg per tablet hydrocodone-acetaminophen 7.5-325mg (NORCO) 7.5-325 mg per tablet    Dosage:  1 tablet PO TID prn severe pain Dosage:  1 tablet PO 3-4 times a week prn severe pain    Reason for Change:  Reorder       STOP taking these medications     doxycycline (VIBRAMYCIN) 100 MG Cap One tab PO TWICE DAILY x 14 days then daily for 14 days. TAKE WITH A MEAL    silodosin (RAPAFLO) 8 mg Cap capsule Take 1 capsule (8 mg total) by mouth once daily.           Verify that the below list of medications is an accurate representation of the medications you are currently taking.  If none reported, the list may be blank. If incorrect, please contact your healthcare provider. Carry this list with you in case of emergency.           Current Medications     amlodipine-benazepril 5-20 mg (LOTREL) 5-20 mg per capsule Take 1 capsule by mouth once daily.    aspirin (ECOTRIN) 81 MG EC tablet Take 81 mg by mouth once daily.    azelastine (OPTIVAR) 0.05 % ophthalmic solution as needed.     clobetasol (TEMOVATE) 0.05 % external solution Thin film to AA scalp QHS PRN pruritus    hydrocodone-acetaminophen 7.5-325mg (NORCO) 7.5-325 mg per tablet 1 tablet PO TID prn severe pain    ketoconazole (NIZORAL) 2 % shampoo Wash hair with medicated shampoo at least 2x/week - let sit on scalp at least 5 minutes prior to rinsing    simvastatin (ZOCOR)  "20 MG tablet Take 1 tablet (20 mg total) by mouth every evening.    tamsulosin (FLOMAX) 0.4 mg Cp24 TAKE 1 CAPSULE (0.4 MG TOTAL) BY MOUTH 2 (TWO) TIMES DAILY.           Clinical Reference Information           Your Vitals Were     BP Pulse Temp Height Weight BMI    118/81 (BP Location: Left arm, Patient Position: Sitting, BP Method: Automatic) 88 98.9 °F (37.2 °C) (Oral) 5' 6" (1.676 m) 76.3 kg (168 lb 3.4 oz) 27.15 kg/m2      Blood Pressure          Most Recent Value    BP  118/81      Allergies as of 3/22/2017     Aminoglycosides    Meperidine    Penicillins      Immunizations Administered on Date of Encounter - 3/22/2017     Name Date Dose VIS Date Route    Pneumococcal Polysaccharide - 23 Valent 3/22/2017 0.5 mL 4/24/2015 Intramuscular      Orders Placed During Today's Visit      Normal Orders This Visit    Pneumococcal Polysaccharide Vaccine (23 Valent) (SQ/IM)     Future Labs/Procedures Expected by Expires    Comprehensive metabolic panel  3/22/2017 5/21/2018    Lipid panel  3/22/2017 5/21/2018      Language Assistance Services     ATTENTION: Language assistance services are available, free of charge. Please call [phone].      ATENCIÓN: Si habla morganañol, tiene a william disposición servicios gratuitos de asistencia lingüística. Llame al [phone].     HAIM Ý: N?u b?n nói Ti?ng Vi?t, có các d?ch v? h? tr? ngôn ng? mi?n phí dành cho b?n. G?i s? [phone].         [place] [facility] complies with applicable Federal civil rights laws and does not discriminate on the basis of race, color, national origin, age, disability, or sex.        " Partially impaired: cannot see medication labels or newsprint, but can see obstacles in path, and the surrounding layout; can count fingers at arm's length

## 2018-10-15 NOTE — BRIEF OPERATIVE NOTE - SPECIMENS
uterus, tubes, cervix, bilateral tubes and ovaries. omentum, right and left para-aortic lymph nodes, right and left pelvic lymph node

## 2018-10-15 NOTE — BRIEF OPERATIVE NOTE - COMMENTS
I agree with above.   I was present and scrubbed for the entire procedure    David Trevizo MD I agree with above.   I was present and scrubbed for the entire procedure    Dictation #[id number]    David Trevizo MD

## 2018-10-15 NOTE — CHART NOTE - NSCHARTNOTEFT_GEN_A_CORE
PACU ANESTHESIA ADMISSION NOTE      Procedure: Total abdominal hysterectomy and bilateral salpingo-oophorectomy  Omentectomy  Appendectomy  Paraaortic node dissection  Pelvic lymph node dissection    Post op diagnosis:  Mucinous adenocarcinoma      ____  Intubated  TV:______       Rate: ______      FiO2: ______    _x___  Patent Airway    _x___  Full return of protective reflexes    _x___  Full recovery from anesthesia / back to baseline status    Vitals:  T(F): 98.3   HR: 108  BP: 151/73  RR: 14  SpO2: 96%    Mental Status:  _x___ Awake   _x____ Alert   _____ Drowsy   _____ Sedated    Nausea/Vomiting:  _x___  NO       ______Yes,   See Post - Op Orders         Pain Scale (0-10):  __0___    Treatment: ____ None    _x___ See Post - Op/PCA Orders    Post - Operative Fluids:   ____ Oral   ___x_ See Post - Op Orders    Plan: Discharge:   ____Home       __x___Floor     _____Critical Care    _____  Other:_________________    Comments:  No anesthesia issues or complications noted.  Discharge when criteria met.

## 2018-10-16 LAB
ANION GAP SERPL CALC-SCNC: 14 MMOL/L — SIGNIFICANT CHANGE UP (ref 7–14)
BUN SERPL-MCNC: 5 MG/DL — LOW (ref 10–20)
CALCIUM SERPL-MCNC: 9.2 MG/DL — SIGNIFICANT CHANGE UP (ref 8.5–10.1)
CHLORIDE SERPL-SCNC: 100 MMOL/L — SIGNIFICANT CHANGE UP (ref 98–110)
CO2 SERPL-SCNC: 27 MMOL/L — SIGNIFICANT CHANGE UP (ref 17–32)
CREAT SERPL-MCNC: 0.5 MG/DL — LOW (ref 0.7–1.5)
GLUCOSE BLDC GLUCOMTR-MCNC: 130 MG/DL — HIGH (ref 70–99)
GLUCOSE SERPL-MCNC: 125 MG/DL — HIGH (ref 70–99)
HCT VFR BLD CALC: 30.2 % — LOW (ref 37–47)
HGB BLD-MCNC: 9.7 G/DL — LOW (ref 12–16)
MAGNESIUM SERPL-MCNC: 1.9 MG/DL — SIGNIFICANT CHANGE UP (ref 1.8–2.4)
MCHC RBC-ENTMCNC: 27.5 PG — SIGNIFICANT CHANGE UP (ref 27–31)
MCHC RBC-ENTMCNC: 32.1 G/DL — SIGNIFICANT CHANGE UP (ref 32–37)
MCV RBC AUTO: 85.6 FL — SIGNIFICANT CHANGE UP (ref 81–99)
NON-GYNECOLOGICAL CYTOLOGY STUDY: SIGNIFICANT CHANGE UP
NRBC # BLD: 0 /100 WBCS — SIGNIFICANT CHANGE UP (ref 0–0)
PHOSPHATE SERPL-MCNC: 3.2 MG/DL — SIGNIFICANT CHANGE UP (ref 2.1–4.9)
PLATELET # BLD AUTO: 338 K/UL — SIGNIFICANT CHANGE UP (ref 130–400)
POTASSIUM SERPL-MCNC: 4 MMOL/L — SIGNIFICANT CHANGE UP (ref 3.5–5)
POTASSIUM SERPL-SCNC: 4 MMOL/L — SIGNIFICANT CHANGE UP (ref 3.5–5)
RBC # BLD: 3.53 M/UL — LOW (ref 4.2–5.4)
RBC # FLD: 13.2 % — SIGNIFICANT CHANGE UP (ref 11.5–14.5)
SODIUM SERPL-SCNC: 141 MMOL/L — SIGNIFICANT CHANGE UP (ref 135–146)
WBC # BLD: 8.41 K/UL — SIGNIFICANT CHANGE UP (ref 4.8–10.8)
WBC # FLD AUTO: 8.41 K/UL — SIGNIFICANT CHANGE UP (ref 4.8–10.8)

## 2018-10-16 RX ORDER — ENOXAPARIN SODIUM 100 MG/ML
40 INJECTION SUBCUTANEOUS ONCE
Qty: 0 | Refills: 0 | Status: COMPLETED | OUTPATIENT
Start: 2018-10-16 | End: 2018-10-16

## 2018-10-16 RX ORDER — NICOTINE POLACRILEX 2 MG
1 GUM BUCCAL DAILY
Qty: 0 | Refills: 0 | Status: DISCONTINUED | OUTPATIENT
Start: 2018-10-16 | End: 2018-10-18

## 2018-10-16 RX ORDER — ENOXAPARIN SODIUM 100 MG/ML
40 INJECTION SUBCUTANEOUS DAILY
Qty: 0 | Refills: 0 | Status: DISCONTINUED | OUTPATIENT
Start: 2018-10-16 | End: 2018-10-18

## 2018-10-16 RX ADMIN — Medication 10 MILLIGRAM(S): at 20:00

## 2018-10-16 RX ADMIN — SIMETHICONE 80 MILLIGRAM(S): 80 TABLET, CHEWABLE ORAL at 06:31

## 2018-10-16 RX ADMIN — Medication 600 MILLIGRAM(S): at 18:14

## 2018-10-16 RX ADMIN — SIMETHICONE 80 MILLIGRAM(S): 80 TABLET, CHEWABLE ORAL at 14:45

## 2018-10-16 RX ADMIN — Medication 100 MILLIGRAM(S): at 18:14

## 2018-10-16 RX ADMIN — Medication 100 MILLIGRAM(S): at 06:28

## 2018-10-16 RX ADMIN — Medication 600 MILLIGRAM(S): at 19:16

## 2018-10-16 RX ADMIN — Medication 600 MILLIGRAM(S): at 07:00

## 2018-10-16 RX ADMIN — ENOXAPARIN SODIUM 40 MILLIGRAM(S): 100 INJECTION SUBCUTANEOUS at 14:44

## 2018-10-16 RX ADMIN — FAMOTIDINE 20 MILLIGRAM(S): 10 INJECTION INTRAVENOUS at 06:29

## 2018-10-16 RX ADMIN — Medication 600 MILLIGRAM(S): at 06:28

## 2018-10-16 RX ADMIN — Medication 600 MILLIGRAM(S): at 11:21

## 2018-10-16 RX ADMIN — Medication 600 MILLIGRAM(S): at 11:52

## 2018-10-16 RX ADMIN — SIMETHICONE 80 MILLIGRAM(S): 80 TABLET, CHEWABLE ORAL at 21:17

## 2018-10-17 RX ORDER — METOCLOPRAMIDE HCL 10 MG
10 TABLET ORAL ONCE
Qty: 0 | Refills: 0 | Status: COMPLETED | OUTPATIENT
Start: 2018-10-17 | End: 2018-10-17

## 2018-10-17 RX ADMIN — SIMETHICONE 80 MILLIGRAM(S): 80 TABLET, CHEWABLE ORAL at 05:53

## 2018-10-17 RX ADMIN — Medication 10 MILLIGRAM(S): at 02:16

## 2018-10-17 RX ADMIN — Medication 600 MILLIGRAM(S): at 17:28

## 2018-10-17 RX ADMIN — SIMETHICONE 80 MILLIGRAM(S): 80 TABLET, CHEWABLE ORAL at 13:18

## 2018-10-17 RX ADMIN — Medication 10 MILLIGRAM(S): at 05:54

## 2018-10-17 RX ADMIN — ENOXAPARIN SODIUM 40 MILLIGRAM(S): 100 INJECTION SUBCUTANEOUS at 13:19

## 2018-10-17 RX ADMIN — FAMOTIDINE 20 MILLIGRAM(S): 10 INJECTION INTRAVENOUS at 19:01

## 2018-10-17 RX ADMIN — FAMOTIDINE 20 MILLIGRAM(S): 10 INJECTION INTRAVENOUS at 05:53

## 2018-10-17 RX ADMIN — Medication 650 MILLIGRAM(S): at 02:17

## 2018-10-17 RX ADMIN — Medication 100 MILLIGRAM(S): at 17:28

## 2018-10-17 RX ADMIN — Medication 600 MILLIGRAM(S): at 23:22

## 2018-10-17 RX ADMIN — Medication 600 MILLIGRAM(S): at 05:52

## 2018-10-17 RX ADMIN — Medication 650 MILLIGRAM(S): at 01:52

## 2018-10-17 RX ADMIN — Medication 100 MILLIGRAM(S): at 05:52

## 2018-10-17 RX ADMIN — SIMETHICONE 80 MILLIGRAM(S): 80 TABLET, CHEWABLE ORAL at 21:18

## 2018-10-17 RX ADMIN — Medication 600 MILLIGRAM(S): at 13:18

## 2018-10-18 ENCOUNTER — TRANSCRIPTION ENCOUNTER (OUTPATIENT)
Age: 43
End: 2018-10-18

## 2018-10-18 VITALS — SYSTOLIC BLOOD PRESSURE: 137 MMHG | RESPIRATION RATE: 18 BRPM | DIASTOLIC BLOOD PRESSURE: 77 MMHG | HEART RATE: 976 BPM

## 2018-10-18 RX ORDER — ACETAMINOPHEN 500 MG
2 TABLET ORAL
Qty: 0 | Refills: 0 | COMMUNITY

## 2018-10-18 RX ORDER — IBUPROFEN 200 MG
1 TABLET ORAL
Qty: 0 | Refills: 0 | COMMUNITY

## 2018-10-18 RX ORDER — ENOXAPARIN SODIUM 100 MG/ML
40 INJECTION SUBCUTANEOUS
Qty: 1200 | Refills: 0 | OUTPATIENT
Start: 2018-10-18 | End: 2018-11-16

## 2018-10-18 RX ORDER — DOCUSATE SODIUM 100 MG
1 CAPSULE ORAL
Qty: 60 | Refills: 0 | OUTPATIENT
Start: 2018-10-18

## 2018-10-18 RX ADMIN — Medication 600 MILLIGRAM(S): at 06:06

## 2018-10-18 RX ADMIN — Medication 100 MILLIGRAM(S): at 06:06

## 2018-10-18 RX ADMIN — ENOXAPARIN SODIUM 40 MILLIGRAM(S): 100 INJECTION SUBCUTANEOUS at 11:05

## 2018-10-18 RX ADMIN — SIMETHICONE 80 MILLIGRAM(S): 80 TABLET, CHEWABLE ORAL at 06:06

## 2018-10-18 RX ADMIN — FAMOTIDINE 20 MILLIGRAM(S): 10 INJECTION INTRAVENOUS at 06:06

## 2018-10-18 RX ADMIN — Medication 600 MILLIGRAM(S): at 11:05

## 2018-10-18 NOTE — DISCHARGE NOTE ADULT - CARE PROVIDER_API CALL
David Trevizo), Gynecologic Oncology; Obstetrics and Gynecology  [address] [place] [address]  Phone: [phone]  Fax: [phone]

## 2018-10-18 NOTE — DISCHARGE NOTE ADULT - CARE PLAN
Principal Discharge DX:	Pelvic mass  Goal:	health patient  Assessment and plan of treatment:	keep wound clean and dry, shower daily. nothing in the vagina for 6 weeks, no tampons, no douching, no baths, no sex. Showers are fine. Please call your doctor or come to the emergency room if you have fever, chills, chest pain, shortness of breath. Principal Discharge DX:	Pelvic mass  Goal:	healthy patient  Assessment and plan of treatment:	keep wound clean and dry, shower daily. nothing in the vagina for 6 weeks, no tampons, no douching, no baths, no sex. Showers are fine. Please call your doctor or come to the emergency room if you have fever, chills, chest pain, shortness of breath. PLease continue taking lovenox injection once a day for 30 days

## 2018-10-18 NOTE — DISCHARGE NOTE ADULT - PATIENT PORTAL LINK FT
You can access the [facility] Patient Portal, offered by [facility], by registering with the following website: [email or web address]

## 2018-10-18 NOTE — DISCHARGE NOTE ADULT - MEDICATION SUMMARY - MEDICATIONS TO TAKE
I will START or STAY ON the medications listed below when I get home from the hospital:    oxyCODONE-acetaminophen 5 mg-325 mg oral tablet  -- 1 tab(s) by mouth every 4 hours, As needed, Moderate Pain (4 - 6) MDD:6  -- Indication: For N83.9 / 08024    docusate sodium 100 mg oral capsule  -- 1 cap(s) by mouth 2 times a day  -- Indication: For N83.9 / 49160 I will START or STAY ON the medications listed below when I get home from the hospital:    oxyCODONE-acetaminophen 5 mg-325 mg oral tablet  -- 1 tab(s) by mouth every 4 hours, As needed, Moderate Pain (4 - 6) MDD:6  -- Indication: For N83.9 / 28385    enoxaparin 40 mg/0.4 mL injectable solution  -- 40 milligram(s) subcutaneously once a day (at bedtime)   -- It is very important that you take or use this exactly as directed.  Do not skip doses or discontinue unless directed by your doctor.    -- Indication: For N83.9 / 16522    docusate sodium 100 mg oral capsule  -- 1 cap(s) by mouth 2 times a day  -- Indication: For N83.9 / 28907

## 2018-10-18 NOTE — PROGRESS NOTE ADULT - SUBJECTIVE AND OBJECTIVE BOX
GYN Progress Note  Pt seen and examined at bedside in NAD. Denies any complaints, no acute events overnight. Pain well controlled overnight    Physical exam:    Vital Signs Last 24 Hrs  T(F): 98.4 (16 Oct 2018 03:45), Max: 98.4 (16 Oct 2018 03:45)  HR: 92 (16 Oct 2018 03:45) (92 - 122)  BP: 127/67 (16 Oct 2018 03:45) (127/67 - 174/89)  RR: 18 (16 Oct 2018 03:45) (12 - 22)  SpO2: 96% (15 Oct 2018 18:22) (95% - 97%)    Gen: alert, oriented  CVS: RRR  Lungs: CTAB  Abdomen: Soft, nontender, non distended. No rebound, rigidity or guarding. Incision clean, dry, intact  Perineum: no active bleeding. Lan in place, clear urine  Ext: No calf tenderness    Diet: clears    MEDICATIONS  (STANDING):  docusate sodium 100 milliGRAM(s) Oral two times a day  famotidine Injectable 20 milliGRAM(s) IV Push two times a day  ibuprofen  Tablet. 600 milliGRAM(s) Oral every 6 hours  lactated ringers. 1000 milliLiter(s) (125 mL/Hr) IV Continuous <Continuous>  metoclopramide Injectable 10 milliGRAM(s) IV Push every 6 hours  simethicone 80 milliGRAM(s) Chew three times a day    MEDICATIONS  (PRN):  acetaminophen   Tablet .. 650 milliGRAM(s) Oral every 6 hours PRN Temp greater or equal to 38C (100.4F), Mild Pain (1 - 3)  oxyCODONE    5 mG/acetaminophen 325 mG 1 Tablet(s) Oral every 4 hours PRN Moderate Pain (4 - 6)  oxyCODONE    5 mG/acetaminophen 325 mG 2 Tablet(s) Oral every 6 hours PRN Severe Pain (7 - 10)      LABS:                        10.1   9.99  )-----------( 339      ( 15 Oct 2018 20:55 )             31.2     Magnesium, Serum: 1.8 mg/dL (10-15 @ 20:55)    10-15-18 @ 20:55      140  |  100  |  6<L>  ----------------------------<  118<H>  4.1   |  24  |  0.5<L>      Ca    8.9      15 Oct 2018 20:55  Phos  4.1     10-15  Mg     1.8     10-15
GYN Progress Note  Pt seen and examined at bedside in NAD. Denies any complaints, no acute events overnight. Patient desires discharge.     Physical exam:    Vital Signs Last 24 Hrs  T(F): 97.5 (17 Oct 2018 21:08), Max: 97.5 (17 Oct 2018 21:08)  HR: 976 (18 Oct 2018 05:49) (88 - 976)  BP: 137/77 (18 Oct 2018 05:49) (128/75 - 137/77)  RR: 18 (18 Oct 2018 05:49) (18 - 20)  SpO2: --    Gen: alert, oriented  CVS: RRR  Lungs: CTAB  Abdomen: Soft, nontender, non distended. No rebound, rigidity or guarding. Incision clean, dry, intact  Perineum: no active bleeding  Ext: No calf tenderness    Diet: regular    MEDICATIONS  (STANDING):  docusate sodium 100 milliGRAM(s) Oral two times a day  enoxaparin Injectable 40 milliGRAM(s) SubCutaneous daily  famotidine Injectable 20 milliGRAM(s) IV Push two times a day  ibuprofen  Tablet. 600 milliGRAM(s) Oral every 6 hours  lactated ringers. 1000 milliLiter(s) (125 mL/Hr) IV Continuous <Continuous>  metoclopramide Injectable 10 milliGRAM(s) IV Push every 6 hours  nicotine - 21 mG/24Hr(s) Patch 1 patch Transdermal daily  simethicone 80 milliGRAM(s) Chew three times a day    MEDICATIONS  (PRN):  acetaminophen   Tablet .. 650 milliGRAM(s) Oral every 6 hours PRN Temp greater or equal to 38C (100.4F), Mild Pain (1 - 3)  oxyCODONE    5 mG/acetaminophen 325 mG 1 Tablet(s) Oral every 4 hours PRN Moderate Pain (4 - 6)  oxyCODONE    5 mG/acetaminophen 325 mG 2 Tablet(s) Oral every 6 hours PRN Severe Pain (7 - 10)      LABS:                        9.7    8.41  )-----------( 338      ( 16 Oct 2018 07:59 )             30.2       10-16-18 @ 07:59      141  |  100  |  5<L>  ----------------------------<  125<H>  4.0   |  27  |  0.5<L>    10-15-18 @ 20:55      140  |  100  |  6<L>  ----------------------------<  118<H>  4.1   |  24  |  0.5<L>        Ca    9.2      16 Oct 2018 07:59  Ca    8.9      15 Oct 2018 20:55  Phos  3.2     10-16  Phos  4.1     10-15  Mg     1.9     10-16  Mg     1.8     10-15                10-16-18 @ 07:01  -  10-17-18 @ 07:00  --------------------------------------------------------  IN: 450 mL / OUT: 1950 mL / NET: -1500 mL
GYN Progress Note  Pt seen and examined at bedside in NAD. Denies any complaints, no acute events overnight. Reports episode of vomiting this morning while taking medications. Passing gas overnight.     Physical exam:    Vital Signs Last 24 Hrs  T(F): 99.5 (17 Oct 2018 05:40), Max: 99.5 (17 Oct 2018 05:40)  HR: 106 (17 Oct 2018 05:40) (88 - 106)  BP: 165/80 (17 Oct 2018 05:40) (120/71 - 165/80)  RR: 18 (17 Oct 2018 05:40) (18 - 20)  SpO2: --    Gen: alert, oriented  CVS: RRR  Lungs: CTAB  Abdomen: Soft, nontender, non distended. No rebound, rigidity or guarding. Incision clean, dry, intact, staples in place.   Perineum: no active bleeding.  Ext: No calf tenderness    Diet: clears    MEDICATIONS  (STANDING):  docusate sodium 100 milliGRAM(s) Oral two times a day  enoxaparin Injectable 40 milliGRAM(s) SubCutaneous daily  famotidine Injectable 20 milliGRAM(s) IV Push two times a day  ibuprofen  Tablet. 600 milliGRAM(s) Oral every 6 hours  lactated ringers. 1000 milliLiter(s) (125 mL/Hr) IV Continuous <Continuous>  metoclopramide Injectable 10 milliGRAM(s) IV Push every 6 hours  nicotine - 21 mG/24Hr(s) Patch 1 patch Transdermal daily  simethicone 80 milliGRAM(s) Chew three times a day    MEDICATIONS  (PRN):  acetaminophen   Tablet .. 650 milliGRAM(s) Oral every 6 hours PRN Temp greater or equal to 38C (100.4F), Mild Pain (1 - 3)  oxyCODONE    5 mG/acetaminophen 325 mG 1 Tablet(s) Oral every 4 hours PRN Moderate Pain (4 - 6)  oxyCODONE    5 mG/acetaminophen 325 mG 2 Tablet(s) Oral every 6 hours PRN Severe Pain (7 - 10)      LABS:                        9.7    8.41  )-----------( 338      ( 16 Oct 2018 07:59 )             30.2                         10.1   9.99  )-----------( 339      ( 15 Oct 2018 20:55 )             31.2     Magnesium, Serum: 1.9 mg/dL (10-16 @ 07:59)    10-16-18 @ 07:59      141  |  100  |  5<L>  ----------------------------<  125<H>  4.0   |  27  |  0.5<L>    10-15-18 @ 20:55      140  |  100  |  6<L>  ----------------------------<  118<H>  4.1   |  24  |  0.5<L>        Ca    9.2      16 Oct 2018 07:59  Ca    8.9      15 Oct 2018 20:55  Phos  3.2     10-16  Phos  4.1     10-15  Mg     1.9     10-16  Mg     1.8     10-15                10-15-18 @ 07:01  -  10-16-18 @ 07:00  --------------------------------------------------------  IN: 800 mL / OUT: 3050 mL / NET: -2250 mL    10-16-18 @ 07:01  -  10-17-18 @ 06:36  --------------------------------------------------------  IN: 450 mL / OUT: 1950 mL / NET: -1500 mL
GYN Progress Note  Pt seen and examined at bedside in NAD. Denies any complaints, pain well controlled at this time. Results of frozen section discussed with patient and family at bedside.     Physical exam:    Vital Signs Last 24 Hrs  T(F): 98 (15 Oct 2018 12:58), Max: 98.3 (15 Oct 2018 12:02)  HR: 116 (15 Oct 2018 15:43) (103 - 122)  BP: 153/77 (15 Oct 2018 15:43) (133/67 - 174/89)  RR: 17 (15 Oct 2018 15:43) (12 - 22)  SpO2: 97% (15 Oct 2018 15:43) (95% - 97%)    Gen: alert, oriented  CVS: RRR  Lungs: CTAB  Abdomen: Soft, nontender, non distended. No rebound, rigidity or guarding. Dressing clean, dry, intact  Perineum: no active bleeding. Lan in place, clear urine  Ext: No calf tenderness, SCD's in place  Lan: 100cc/hr    Diet: NPO    MEDICATIONS  (STANDING):  clindamycin IVPB 900 milliGRAM(s) IV Intermittent every 8 hours  docusate sodium 100 milliGRAM(s) Oral two times a day  famotidine  IVPB 20 milliGRAM(s) IV Intermittent daily  HYDROmorphone PCA (1 mG/mL) 30 milliLiter(s) PCA Continuous PCA Continuous  ibuprofen  Tablet. 600 milliGRAM(s) Oral every 6 hours  lactated ringers. 1000 milliLiter(s) (125 mL/Hr) IV Continuous <Continuous>  lactated ringers. 1000 milliLiter(s) (125 mL/Hr) IV Continuous <Continuous>  simethicone 80 milliGRAM(s) Chew three times a day    MEDICATIONS  (PRN):  acetaminophen   Tablet .. 650 milliGRAM(s) Oral every 6 hours PRN Temp greater or equal to 38C (100.4F), Mild Pain (1 - 3)  dexamethasone  IVPB 4 milliGRAM(s) IV Intermittent once PRN Nausea and/or Vomiting  HYDROmorphone  Injectable 0.5 milliGRAM(s) IV Push every 10 minutes PRN Moderate Pain (4 - 6)  HYDROmorphone  Injectable 1 milliGRAM(s) IV Push every 10 minutes PRN Severe Pain (7 - 10)  meperidine     Injectable 12.5 milliGRAM(s) IV Push every 10 minutes PRN Shivering  ondansetron Injectable 4 milliGRAM(s) IV Push once PRN Nausea and/or Vomiting  ondansetron Injectable 4 milliGRAM(s) IV Push every 6 hours PRN Nausea and/or Vomiting  oxyCODONE    5 mG/acetaminophen 325 mG 1 Tablet(s) Oral every 4 hours PRN Moderate Pain (4 - 6)  oxyCODONE    5 mG/acetaminophen 325 mG 2 Tablet(s) Oral every 6 hours PRN Severe Pain (7 - 10)      LABS:    no new labs

## 2018-10-18 NOTE — DISCHARGE NOTE ADULT - HOSPITAL COURSE
s/p exlap SANTA, BSO, ometectomy, appendectomy, lymph node dissection. Post operative course uncomplicated, discharged home on POD 3. Follow up in office for staple removal in 1 week.

## 2018-10-18 NOTE — DISCHARGE NOTE ADULT - PLAN OF CARE
health patient keep wound clean and dry, shower daily. nothing in the vagina for 6 weeks, no tampons, no douching, no baths, no sex. Showers are fine. Please call your doctor or come to the emergency room if you have fever, chills, chest pain, shortness of breath. healthy patient keep wound clean and dry, shower daily. nothing in the vagina for 6 weeks, no tampons, no douching, no baths, no sex. Showers are fine. Please call your doctor or come to the emergency room if you have fever, chills, chest pain, shortness of breath. PLease continue taking lovenox injection once a day for 30 days

## 2018-10-19 PROBLEM — J45.909 UNSPECIFIED ASTHMA, UNCOMPLICATED: Chronic | Status: ACTIVE | Noted: 2018-10-01

## 2018-10-24 DIAGNOSIS — F17.200 NICOTINE DEPENDENCE, UNSPECIFIED, UNCOMPLICATED: ICD-10-CM

## 2018-10-24 DIAGNOSIS — C56.2 MALIGNANT NEOPLASM OF LEFT OVARY: ICD-10-CM

## 2018-10-24 DIAGNOSIS — Z88.0 ALLERGY STATUS TO PENICILLIN: ICD-10-CM

## 2018-10-24 DIAGNOSIS — J45.909 UNSPECIFIED ASTHMA, UNCOMPLICATED: ICD-10-CM

## 2018-10-26 ENCOUNTER — OUTPATIENT (OUTPATIENT)
Dept: OUTPATIENT SERVICES | Facility: HOSPITAL | Age: 43
LOS: 1 days | Discharge: HOME | End: 2018-10-26

## 2018-10-26 ENCOUNTER — APPOINTMENT (OUTPATIENT)
Dept: GYNECOLOGIC ONCOLOGY | Facility: CLINIC | Age: 43
End: 2018-10-26

## 2018-10-26 VITALS
HEART RATE: 97 BPM | SYSTOLIC BLOOD PRESSURE: 135 MMHG | RESPIRATION RATE: 14 BRPM | TEMPERATURE: 96.5 F | HEIGHT: 64 IN | DIASTOLIC BLOOD PRESSURE: 90 MMHG | WEIGHT: 206 LBS | BODY MASS INDEX: 35.17 KG/M2

## 2018-10-26 DIAGNOSIS — R59.0 LOCALIZED ENLARGED LYMPH NODES: Chronic | ICD-10-CM

## 2018-10-26 DIAGNOSIS — Z98.891 HISTORY OF UTERINE SCAR FROM PREVIOUS SURGERY: Chronic | ICD-10-CM

## 2018-10-29 LAB — SURGICAL PATHOLOGY STUDY: SIGNIFICANT CHANGE UP

## 2018-11-02 ENCOUNTER — APPOINTMENT (OUTPATIENT)
Dept: GYNECOLOGIC ONCOLOGY | Facility: CLINIC | Age: 43
End: 2018-11-02

## 2018-11-02 ENCOUNTER — OUTPATIENT (OUTPATIENT)
Dept: OUTPATIENT SERVICES | Facility: HOSPITAL | Age: 43
LOS: 1 days | Discharge: HOME | End: 2018-11-02

## 2018-11-02 VITALS
BODY MASS INDEX: 35.17 KG/M2 | HEART RATE: 98 BPM | WEIGHT: 206 LBS | DIASTOLIC BLOOD PRESSURE: 80 MMHG | HEIGHT: 64 IN | SYSTOLIC BLOOD PRESSURE: 125 MMHG | TEMPERATURE: 96.7 F | RESPIRATION RATE: 14 BRPM

## 2018-11-02 DIAGNOSIS — R59.0 LOCALIZED ENLARGED LYMPH NODES: Chronic | ICD-10-CM

## 2018-11-02 DIAGNOSIS — N83.9 NONINFLAMMATORY DISORDER OF OVARY, FALLOPIAN TUBE AND BROAD LIGAMENT, UNSPECIFIED: ICD-10-CM

## 2018-11-02 DIAGNOSIS — Z98.891 HISTORY OF UTERINE SCAR FROM PREVIOUS SURGERY: Chronic | ICD-10-CM

## 2018-11-05 DIAGNOSIS — C56.9 MALIGNANT NEOPLASM OF UNSPECIFIED OVARY: ICD-10-CM

## 2018-11-14 DIAGNOSIS — C56.9 MALIGNANT NEOPLASM OF UNSPECIFIED OVARY: ICD-10-CM

## 2018-11-30 ENCOUNTER — APPOINTMENT (OUTPATIENT)
Dept: GYNECOLOGIC ONCOLOGY | Facility: CLINIC | Age: 43
End: 2018-11-30

## 2018-11-30 ENCOUNTER — OUTPATIENT (OUTPATIENT)
Dept: OUTPATIENT SERVICES | Facility: HOSPITAL | Age: 43
LOS: 1 days | Discharge: HOME | End: 2018-11-30

## 2018-11-30 VITALS
SYSTOLIC BLOOD PRESSURE: 150 MMHG | BODY MASS INDEX: 35.85 KG/M2 | HEART RATE: 96 BPM | WEIGHT: 210 LBS | HEIGHT: 64 IN | RESPIRATION RATE: 14 BRPM | DIASTOLIC BLOOD PRESSURE: 90 MMHG

## 2018-11-30 DIAGNOSIS — Z98.891 HISTORY OF UTERINE SCAR FROM PREVIOUS SURGERY: Chronic | ICD-10-CM

## 2018-11-30 DIAGNOSIS — R59.0 LOCALIZED ENLARGED LYMPH NODES: Chronic | ICD-10-CM

## 2018-12-03 DIAGNOSIS — C56.9 MALIGNANT NEOPLASM OF UNSPECIFIED OVARY: ICD-10-CM

## 2019-06-25 NOTE — ASU PREOP CHECKLIST - WEIGHT IN KG
Pt has not been seen in over a year, has had multiple refills and 13 cancellations. Please advise   99.8

## 2019-08-21 NOTE — ASU PREOP CHECKLIST - NOTHING BY MOUTH SINCE
Anticoagulation Clinic Follow up Note    Today's INR: 2.3 (therapeutic) within INR goal of 1.8 to 2.8.     Previous INR: 2.4 (therapeutic from 4 week(s) ago)    Previous dose change: no change     Missed Doses: none    Medication changes: none    Diet changes: none    Lifestyle changes: none    Signs and Symptoms of bleeding: none    Disease state exacerbations: none    Planned procedures: none    Additional Education (if needed): not applicable    Thi Allen was provided dosing instructions and expressed verbal understanding and has no further questions at this time.    Visit specific notes: none    Plan:  1. No change; Continue warfarin 2.5 mg PO daily, except warfarin 5 mg PO on Sunday, Wednesday, and Fridays. .  2. RTC in 4 week(s).      Acosta Lewis, Pharmacy Intern  8/21/2019  11:19 AM   14-Oct-2018 00:00

## 2019-12-29 ENCOUNTER — OUTPATIENT (OUTPATIENT)
Dept: OUTPATIENT SERVICES | Facility: HOSPITAL | Age: 44
LOS: 1 days | Discharge: HOME | End: 2019-12-29
Payer: COMMERCIAL

## 2019-12-29 DIAGNOSIS — I82.439 ACUTE EMBOLISM AND THROMBOSIS OF UNSPECIFIED POPLITEAL VEIN: ICD-10-CM

## 2019-12-29 DIAGNOSIS — Z98.891 HISTORY OF UTERINE SCAR FROM PREVIOUS SURGERY: Chronic | ICD-10-CM

## 2019-12-29 DIAGNOSIS — R59.0 LOCALIZED ENLARGED LYMPH NODES: Chronic | ICD-10-CM

## 2019-12-29 PROCEDURE — 93971 EXTREMITY STUDY: CPT | Mod: 26,LT

## 2020-02-27 NOTE — PATIENT PROFILE ADULT - NSASFALLATTEMPTOOB_GEN_A_NUR
Iva called back as requested.     She was informed of her Ferritin level and the need for 2 Injectafer infusions 1 week apart to bring up the Ferritin Level.     Iva states that she is fatigued- rating it a \"7\"-\"8\"/\"10\" but otherwise denies complaints. Specifically denies bleeding gums, epistaxis, blood in urine or stool, black stools.Denies unusual bruising.    She was instructed that per POC parameters from Dr Reilly- she should be re-evaluated by GI. She was informed that a referral order was sent requesting that she be seen by Dr Carlin who had seen her from [id number]. To determine if bleeding in the GI tract is,at least partially responsible for the falling Ferritin levels.    Pt verbalized understanding of and agreement with instruction/ POC. She verbalized appreciation for the information and seemed to accept this information well.    Iva was then routed to  to schedule the two Injectafer appointments, the monthly Ferritin levels and confirm 6 month MD FU scheduled for 3/9/2020 with labs done 2 days prior.     no

## 2020-07-27 NOTE — BRIEF OPERATIVE NOTE - OPERATION/FINDINGS
12 week sized uterus normal right tube and ovary. 25 cm left adnexal mass adherent to omentum and small bowel. Enlarged right para-aortic lymphnodes. Dense adhesions of anterior abdominal wall and bladder to anterior uterus.
Rasta Turner Jr (spouse)

## 2020-08-01 NOTE — PATIENT PROFILE ADULT - NSPROGENPREVTRANSF_GEN_A_NUR
History  Chief Complaint   Patient presents with    Dizziness     Brought in by EMS, reports 3 episode of diarrhea and one episode of vomiting which caused her to be dizzy, "dull" chest pain, abdominal pain     68-year-old female with history of diabetes, hypertension, hyperlipidemia comes in today complaining of sudden onset headache, dizziness, chest pain, shortness of breath, nausea, vomiting, diarrhea  He reports that the headache is a 10/10 starts in the front of her head and goes to the back of her head  Dizziness is like the room is spinning  Chest pain is dull and severe  Shortness of breath is fairly severe and feels like she cannot catch her breath  She had 3 episodes of diarrhea and 1 episode of vomiting prior to arrival           Prior to Admission Medications   Prescriptions Last Dose Informant Patient Reported? Taking? FLUoxetine (PROzac) 10 mg capsule   No No   Sig: Take 3 capsules (30 mg total) by mouth daily   gabapentin (NEURONTIN) 100 mg capsule   No No   Sig: Take 1 capsule (100 mg total) by mouth 2 (two) times a day Take 100 mg in the morning and 100 mg mid afternoon   gabapentin (NEURONTIN) 300 mg capsule   No No   Sig: Take 1 capsule (300 mg total) by mouth daily at bedtime   insulin aspart protamine-insulin aspart (NovoLOG 70/30) 100 units/mL injection   Yes No   Sig: Inject 60 Units under the skin 2 (two) times a day before meals   levothyroxine 50 mcg tablet   Yes No   Sig: Take 50 mcg by mouth daily Indications: Pt unsure of the amount     lisinopril (ZESTRIL) 10 mg tablet   Yes No   Sig: Take 10 mg by mouth daily   metoprolol succinate (TOPROL-XL) 25 mg 24 hr tablet   Yes No   Sig: Take 25 mg by mouth daily   pantoprazole (PROTONIX) 40 mg tablet   No No   Sig: Take 1 tablet by mouth daily in the early morning   topiramate (TOPAMAX) 100 mg tablet   Yes No   Sig: Take 200 mg by mouth every morning     topiramate (TOPAMAX) 100 mg tablet   Yes No   Sig: Take 200 mg by mouth daily at bedtime  Facility-Administered Medications: None       Past Medical History:   Diagnosis Date    Diabetes mellitus ([address] )     Disease of thyroid gland     Hyperlipidemia     Hypertension     Hypothyroidism (acquired)     Restless leg syndrome     Seizures (HCC)        Past Surgical History:   Procedure Laterality Date    APPENDECTOMY      CHOLECYSTECTOMY      HIP FRACTURE SURGERY Right     TONSILLECTOMY      TUBAL LIGATION         Family History   Problem Relation Age of Onset    Seizures Father      I have reviewed and agree with the history as documented  E-Cigarette/Vaping    E-Cigarette Use Never User      E-Cigarette/Vaping Substances     Social History     Tobacco Use    Smoking status: Never Smoker    Smokeless tobacco: Never Used    Tobacco comment: not a smoker   Substance Use Topics    Alcohol use: Yes     Frequency: Monthly or less     Comment: occasional    Drug use: No       Review of Systems   Constitutional: Negative for chills and fever  Eyes: Positive for photophobia  Respiratory: Positive for shortness of breath  Cardiovascular: Positive for chest pain  Gastrointestinal: Positive for abdominal pain, diarrhea, nausea and vomiting  Genitourinary: Negative for dysuria  Musculoskeletal: Negative for neck stiffness  Skin: Negative for rash  Neurological: Positive for dizziness and headaches  Psychiatric/Behavioral: Negative for confusion  Physical Exam  Physical Exam   Constitutional: She is oriented to person, place, and time  She appears well-developed and well-nourished  No distress  HENT:   Head: Normocephalic and atraumatic  Eyes: Conjunctivae and EOM are normal    Cardiovascular: Normal rate, regular rhythm and normal heart sounds  No murmur heard  Pulmonary/Chest: Breath sounds normal  She is in respiratory distress (Mild tachypnea)  Abdominal: Soft  There is tenderness (Diffuse lower abdominal tenderness)     Musculoskeletal: Normal range of motion  Neurological: She is alert and oriented to person, place, and time  Skin: Skin is warm and dry  No rash noted  Psychiatric: She has a normal mood and affect  Her behavior is normal        Vital Signs  ED Triage Vitals   Temperature Pulse Respirations Blood Pressure SpO2   08/01/20 1638 08/01/20 1638 08/01/20 1638 08/01/20 1638 08/01/20 1705   98 5 °F (36 9 °C) 90 (!) 28 137/90 98 %      Temp Source Heart Rate Source Patient Position - Orthostatic VS BP Location FiO2 (%)   08/01/20 1638 08/01/20 1638 08/01/20 1638 08/01/20 1638 --   Tympanic Monitor Lying Left arm       Pain Score       08/01/20 1638       Worst Possible Pain           Vitals:    08/01/20 1638 08/01/20 1738 08/01/20 1815   BP: 137/90 144/65 (!) 172/86   Pulse: 90 84 81   Patient Position - Orthostatic VS: Lying           Visual Acuity  Visual Acuity      Most Recent Value   L Pupil Size (mm)  3   R Pupil Size (mm)  3          ED Medications  Medications   sodium chloride 0 9 % bolus 1,000 mL (0 mL Intravenous Stopped 8/1/20 1811)   iohexol (OMNIPAQUE) 350 MG/ML injection (MULTI-DOSE) 100 mL (100 mL Intravenous Given 8/1/20 1840)   ketorolac (TORADOL) injection 15 mg (15 mg Intravenous Given 8/1/20 1908)   meclizine (ANTIVERT) tablet 25 mg (25 mg Oral Given 8/1/20 1908)   diphenhydrAMINE (BENADRYL) injection 25 mg (25 mg Intravenous Given 8/1/20 2015)   butalbital-acetaminophen-caffeine (FIORICET,ESGIC) -40 mg per tablet 1 tablet (1 tablet Oral Given 8/1/20 2014)       Diagnostic Studies  Results Reviewed     Procedure Component Value Units Date/Time    [facility] [[id number]]  (Normal) Collected:  08/01/20 1707    Lab Status:  Final result Specimen:  Nares from Nasopharyngeal Swab Updated:  08/01/20 1851     SARS-CoV-2 Negative    Narrative:        The specimen collection materials, transport medium, and/or testing methodology utilized in the production of these test results have been proven to be reliable in a limited validation with an abbreviated program under the Emergency Utilization Authorization provided by the FDA  Testing reported as "Presumptive positive" will be confirmed with secondary testing with a reference laboratory to ensure result accuracy  Clinical caution and judgement should be used with the interpretation of these results with consideration of the clinical impression and other laboratory testing  Testing reported as "Positive" or "Negative" has been proven to be accurate according to standard laboratory validation requirements  All testing is performed with control materials showing appropriate reactivity at standard intervals  Troponin I [[id number]]  (Normal) Collected:  08/01/20 1707    Lab Status:  Final result Specimen:  Blood from Arm, Left Updated:  08/01/20 1758     Troponin I <0 03 ng/mL     Lactic acid [[id number]]  (Normal) Collected:  08/01/20 1707    Lab Status:  Final result Specimen:  Blood from Arm, Left Updated:  08/01/20 1751     LACTIC ACID 1 4 mmol/L     Narrative:       Result may be elevated if tourniquet was used during collection      Magnesium [[id number]]  (Normal) Collected:  08/01/20 1707    Lab Status:  Final result Specimen:  Blood from Arm, Left Updated:  08/01/20 1751     Magnesium 1 6 mg/dL     Lipase [[id number]]  (Abnormal) Collected:  08/01/20 1707    Lab Status:  Final result Specimen:  Blood from Arm, Left Updated:  08/01/20 1751     Lipase 10 u/L     Comprehensive metabolic panel [[id number]]  (Abnormal) Collected:  08/01/20 1707    Lab Status:  Final result Specimen:  Blood from Arm, Left Updated:  08/01/20 1750     Sodium 136 mmol/L      Potassium 3 9 mmol/L      Chloride 99 mmol/L      CO2 29 mmol/L      ANION GAP 8 mmol/L      BUN 15 mg/dL      Creatinine 0 55 mg/dL      Glucose 261 mg/dL      Calcium 9 0 mg/dL      AST 11 U/L      ALT 10 U/L      Alkaline Phosphatase 89 2 U/L      Total Protein 6 9 g/dL      Albumin 3 6 g/dL      Total Bilirubin 0 56 mg/dL      eGFR 99 ml/min/1 73sq m     Narrative:       National Kidney Disease Foundation guidelines for Chronic Kidney Disease (CKD):     Stage 1 with normal or high GFR (GFR > 90 mL/min/1 73 square meters)    Stage 2 Mild CKD (GFR = 60-89 mL/min/1 73 square meters)    Stage 3A Moderate CKD (GFR = 45-59 mL/min/1 73 square meters)    Stage 3B Moderate CKD (GFR = 30-44 mL/min/1 73 square meters)    Stage 4 Severe CKD (GFR = 15-29 mL/min/1 73 square meters)    Stage 5 End Stage CKD (GFR <15 mL/min/1 73 square meters)  Note: GFR calculation is accurate only with a steady state creatinine    CBC and differential [[id number]]  (Normal) Collected:  08/01/20 1707    Lab Status:  Final result Specimen:  Blood from Arm, Left Updated:  08/01/20 1725     WBC 7 77 Thousand/uL      RBC 5 04 Million/uL      Hemoglobin 14 5 g/dL      Hematocrit 43 3 %      MCV 86 fL      MCH 28 8 pg      MCHC 33 5 g/dL      RDW 12 6 %      MPV 10 9 fL      Platelets 713 Thousands/uL      Neutrophils Relative 73 %      Immat GRANS % 1 %      Lymphocytes Relative 16 %      Monocytes Relative 8 %      Eosinophils Relative 2 %      Basophils Relative 0 %      Neutrophils Absolute 5 71 Thousands/µL      Immature Grans Absolute 0 05 Thousand/uL      Lymphocytes Absolute 1 22 Thousands/µL      Monocytes Absolute 0 64 Thousand/µL      Eosinophils Absolute 0 12 Thousand/µL      Basophils Absolute 0 03 Thousands/µL                  CT head without contrast   Final Result by Gurjit Coppola MD (08/01 1846)      No acute intracranial abnormality  Workstation performed: [id number]         CT chest abdomen pelvis w contrast   Final Result by Gurjit Coppola MD (08/01 5284)         1  No acute cardiopulmonary process  2   No evidence of acute intra-abdominal or pelvic process              Workstation performed: [id number]                    Procedures  Procedures         ED Course       US AUDIT      Most Recent Value Initial Alcohol Screen: US AUDIT-C    1  How often do you have a drink containing alcohol? 1 Filed at: 08/01/2020 1711   2  How many drinks containing alcohol do you have on a typical day you are drinking? 0 Filed at: 08/01/2020 1711   3a  Male UNDER 65: How often do you have five or more drinks on one occasion? 0 Filed at: 08/01/2020 1711   3b  FEMALE Any Age, or MALE 65+: How often do you have 4 or more drinks on one occassion? 1 Filed at: 08/01/2020 1711   Audit-C Score  2 Filed at: 08/01/2020 1711                  CANDY/DAST-10      Most Recent Value   How many times in the past year have you    Used an illegal drug or used a prescription medication for non-medical reasons? Never Filed at: 08/01/2020 1711                                MDM      Disposition  Final diagnoses:   Migraine   Gastroenteritis     Time reflects when diagnosis was documented in both MDM as applicable and the Disposition within this note     Time User Action Codes Description Comment    8/1/2020  9:13 PM Luis Navarro Add [G43 909] Migraine     8/1/2020  9:13 PM Luis Navarro Add [K52 9] Gastroenteritis       ED Disposition     ED Disposition Condition Date/Time Comment    Discharge Stable Sat Aug 1, 2020  9:13 PM Shai Lamb discharge to home/self care  Follow-up Information     Follow up With Specialties Details Why Contact Info    Brittny Braun MD [facility] Medicine Schedule an appointment as soon as possible for a visit   [address]  [place] [address] [address]  [phone]      Neurologist  Schedule an appointment as soon as possible for a visit             Patient's Medications   Discharge Prescriptions    BUTALBITAL-ACETAMINOPHEN-CAFFEINE (FIORICET,ESGIC) -40 MG PER TABLET    Take 1 tablet by mouth every 4 (four) hours as needed for headaches       Start Date: 8/1/2020  End Date: --       Order Dose: 1 tablet       Quantity: 30 tablet    Refills: 0     No discharge procedures on file      PDMP Review None          ED Provider  Electronically Signed by           Toi Royal PA-C  08/01/20 7139 no

## 2021-08-19 ENCOUNTER — OUTPATIENT (OUTPATIENT)
Dept: OUTPATIENT SERVICES | Facility: HOSPITAL | Age: 46
LOS: 1 days | Discharge: HOME | End: 2021-08-19
Payer: COMMERCIAL

## 2021-08-19 DIAGNOSIS — R59.0 LOCALIZED ENLARGED LYMPH NODES: Chronic | ICD-10-CM

## 2021-08-19 DIAGNOSIS — Z12.31 ENCOUNTER FOR SCREENING MAMMOGRAM FOR MALIGNANT NEOPLASM OF BREAST: ICD-10-CM

## 2021-08-19 DIAGNOSIS — Z98.891 HISTORY OF UTERINE SCAR FROM PREVIOUS SURGERY: Chronic | ICD-10-CM

## 2021-08-19 PROCEDURE — 77067 SCR MAMMO BI INCL CAD: CPT | Mod: 26

## 2021-08-19 PROCEDURE — 77063 BREAST TOMOSYNTHESIS BI: CPT | Mod: 26

## 2021-08-24 NOTE — PRE-ANESTHESIA EVALUATION ADULT - BSA (M2)
saw and examined patient in the afternoon. had not yet voided since davila removed but tolerated PO
2.04

## 2022-03-28 NOTE — PROGRESS NOTE ADULT - ASSESSMENT
No
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 Trevizo
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 Trevizo
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 Trevizo
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 Trevizo

## 2023-03-28 ENCOUNTER — APPOINTMENT (OUTPATIENT)
Dept: ORTHOPEDIC SURGERY | Facility: CLINIC | Age: 48
End: 2023-03-28
Payer: COMMERCIAL

## 2023-03-28 VITALS — WEIGHT: 225 LBS | BODY MASS INDEX: 38.62 KG/M2

## 2023-03-28 DIAGNOSIS — M17.10 UNILATERAL PRIMARY OSTEOARTHRITIS, UNSPECIFIED KNEE: ICD-10-CM

## 2023-03-28 PROCEDURE — 99203 OFFICE O/P NEW LOW 30 MIN: CPT | Mod: 25

## 2023-03-28 PROCEDURE — 20610 DRAIN/INJ JOINT/BURSA W/O US: CPT | Mod: RT

## 2023-03-28 PROCEDURE — 73562 X-RAY EXAM OF KNEE 3: CPT | Mod: RT

## 2023-03-28 NOTE — HISTORY OF PRESENT ILLNESS
[de-identified] : 47-year-old female comes in today for an evaluation of her right knee pain that began on March 19.  She cannot recall any specific injury or trauma.  She states she has some pain radiating to the shin.  She has been using a knee brace.  Pain with ambulation.  History of hypertension.

## 2023-03-28 NOTE — ASSESSMENT
[FreeTextEntry1] : At this time we discussed all options.  I gave her an AA[facility] knee conditioning home exercise program.  We agreed to also try cortisone injection today.  Today in the office under sterile conditions I injected the right knee lateral approach with 3 cc lidocaine, 2 cc dexamethasone.  Patient tolerated the procedure well.  Puncture is over the Band-Aid.  Icing if she has any soreness.  She is going to take Advil over-the-counter.  She will call me if she would like to try prescription anti-inflammatory.  We will follow-up with her in several weeks for repeat evaluation, if the pain worsens or continues we discussed further imaging with MRI.\par \par This patient was seen under the supervision of Dr. Mei.\par

## 2023-03-28 NOTE — IMAGING
[de-identified] : On examination of the right knee no swelling, no knee effusion, no ecchymosis, no erythema.  Skin is intact.  Is able to straight leg raise.  Is able to actively flex and extend the knee, crepitus is noted with range of motion.  Tenderness along the medial joint line, nontender along the lateral joint.  No tenderness over the patella, patella tendon or quadricep tendon.  Stable to stress testing.  Calf is soft.\par \par \par X-ray right knee today no obvious fracture or dislocation.  Arthritic changes noted, calcification seen medially and laterally\par

## 2023-04-19 ENCOUNTER — NON-APPOINTMENT (OUTPATIENT)
Age: 48
End: 2023-04-19

## 2023-05-03 ENCOUNTER — APPOINTMENT (OUTPATIENT)
Dept: GYNECOLOGIC ONCOLOGY | Facility: CLINIC | Age: 48
End: 2023-05-03
Payer: COMMERCIAL

## 2023-05-03 VITALS
BODY MASS INDEX: 37.56 KG/M2 | HEIGHT: 64 IN | DIASTOLIC BLOOD PRESSURE: 69 MMHG | SYSTOLIC BLOOD PRESSURE: 118 MMHG | WEIGHT: 220 LBS

## 2023-05-03 DIAGNOSIS — Z00.00 ENCOUNTER FOR GENERAL ADULT MEDICAL EXAMINATION W/OUT ABNORMAL FINDINGS: ICD-10-CM

## 2023-05-03 PROCEDURE — 99203 OFFICE O/P NEW LOW 30 MIN: CPT

## 2023-05-03 NOTE — HISTORY OF PRESENT ILLNESS
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

## 2023-05-03 NOTE — ASSESSMENT
Called and spoke with patient in reagrds to lab results. Discussed with patient that his A is normal so continue what he is doing. His E and K are low like his D, so advised patient to get back on ADEK taking regularly. Patient understands. No further questions. Patient aware to get labs rechecked in 2 months and follow up with Mirtha Alejandra PA-C.    [FreeTextEntry1] : FATOU

## 2023-05-03 NOTE — REVIEW OF SYSTEMS
[Negative] : Musculoskeletal [Rash] : no rash noted [Ulcer] : no ulcer was seen [Syncope] : no syncope noted [Neuropathy] : no neuropathy [Depression] : no depression [Hematuria] : no hematuria [Abn Vag Bleeding] : no abnormal vaginal bleeding [Incontinence] : no incontinence [Fatigue] : no fatigue [Recent Wt Gain ___ Lbs] : no recent weight gain [Recent Wt Loss___ Lbs] : no recent weight loss [Chest Pain] : no chest pain [LITTLE] : no dyspnea on exertion [Wheezing] : no wheezing [SOB on Exertion] : no shortness of breath during exertion [Bloody Stools] : no bloody stools [Nausea] : no nausea/vomitting [Muscle Weakness] : no muscle weakness

## 2023-05-03 NOTE — DISCUSSION/SUMMARY
[Visit Time ___ Minutes] : [unfilled] minutes [Face to Face Time___ Minutes] : with [unfilled] minutes in face to face consultation. 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

## 2023-05-03 NOTE — PHYSICAL EXAM
[Absent] : Adnexa(ae): Absent [Normal] : Recto-Vaginal Exam: Normal [FreeTextEntry1] : NAD [de-identified] : ALLAN [de-identified] : no edema [de-identified] : soft NT/ND [de-identified] : no masses or nodularity [Fully active, able to carry on all pre-disease performance without restriction] : Status 0 - Fully active, able to carry on all pre-disease performance without restriction

## 2023-05-05 LAB
CANCER AG125 SERPL-ACNC: 10 U/ML
CEA SERPL-MCNC: <0.6 NG/ML

## 2023-05-12 ENCOUNTER — RESULT REVIEW (OUTPATIENT)
Age: 48
End: 2023-05-12

## 2023-05-12 ENCOUNTER — NON-APPOINTMENT (OUTPATIENT)
Age: 48
End: 2023-05-12

## 2023-05-12 ENCOUNTER — OUTPATIENT (OUTPATIENT)
Dept: OUTPATIENT SERVICES | Facility: HOSPITAL | Age: 48
LOS: 1 days | End: 2023-05-12
Payer: COMMERCIAL

## 2023-05-12 ENCOUNTER — TRANSCRIPTION ENCOUNTER (OUTPATIENT)
Age: 48
End: 2023-05-12

## 2023-05-12 DIAGNOSIS — R59.0 LOCALIZED ENLARGED LYMPH NODES: Chronic | ICD-10-CM

## 2023-05-12 DIAGNOSIS — Z98.891 HISTORY OF UTERINE SCAR FROM PREVIOUS SURGERY: Chronic | ICD-10-CM

## 2023-05-12 DIAGNOSIS — Z12.31 ENCOUNTER FOR SCREENING MAMMOGRAM FOR MALIGNANT NEOPLASM OF BREAST: ICD-10-CM

## 2023-05-12 PROCEDURE — 77063 BREAST TOMOSYNTHESIS BI: CPT

## 2023-05-12 PROCEDURE — 77063 BREAST TOMOSYNTHESIS BI: CPT | Mod: 26

## 2023-05-12 PROCEDURE — 77067 SCR MAMMO BI INCL CAD: CPT

## 2023-05-12 PROCEDURE — 77067 SCR MAMMO BI INCL CAD: CPT | Mod: 26

## 2023-05-13 DIAGNOSIS — Z12.31 ENCOUNTER FOR SCREENING MAMMOGRAM FOR MALIGNANT NEOPLASM OF BREAST: ICD-10-CM

## 2023-05-16 ENCOUNTER — RESULT REVIEW (OUTPATIENT)
Age: 48
End: 2023-05-16

## 2023-05-16 ENCOUNTER — OUTPATIENT (OUTPATIENT)
Dept: OUTPATIENT SERVICES | Facility: HOSPITAL | Age: 48
LOS: 1 days | End: 2023-05-16
Payer: COMMERCIAL

## 2023-05-16 DIAGNOSIS — R92.8 OTHER ABNORMAL AND INCONCLUSIVE FINDINGS ON DIAGNOSTIC IMAGING OF BREAST: ICD-10-CM

## 2023-05-16 DIAGNOSIS — R59.0 LOCALIZED ENLARGED LYMPH NODES: Chronic | ICD-10-CM

## 2023-05-16 DIAGNOSIS — Z98.891 HISTORY OF UTERINE SCAR FROM PREVIOUS SURGERY: Chronic | ICD-10-CM

## 2023-05-16 PROCEDURE — 76642 ULTRASOUND BREAST LIMITED: CPT | Mod: 26,50

## 2023-05-16 PROCEDURE — 76642 ULTRASOUND BREAST LIMITED: CPT | Mod: 50

## 2023-05-17 DIAGNOSIS — R92.8 OTHER ABNORMAL AND INCONCLUSIVE FINDINGS ON DIAGNOSTIC IMAGING OF BREAST: ICD-10-CM

## 2023-05-25 ENCOUNTER — APPOINTMENT (OUTPATIENT)
Dept: ORTHOPEDIC SURGERY | Facility: CLINIC | Age: 48
End: 2023-05-25

## 2024-02-05 ENCOUNTER — NON-APPOINTMENT (OUTPATIENT)
Age: 49
End: 2024-02-05

## 2024-05-08 ENCOUNTER — APPOINTMENT (OUTPATIENT)
Dept: GYNECOLOGIC ONCOLOGY | Facility: CLINIC | Age: 49
End: 2024-05-08
Payer: COMMERCIAL

## 2024-05-08 VITALS
BODY MASS INDEX: 38.41 KG/M2 | SYSTOLIC BLOOD PRESSURE: 125 MMHG | HEIGHT: 64 IN | HEART RATE: 74 BPM | DIASTOLIC BLOOD PRESSURE: 76 MMHG | WEIGHT: 225 LBS

## 2024-05-08 DIAGNOSIS — N95.1 MENOPAUSAL AND FEMALE CLIMACTERIC STATES: ICD-10-CM

## 2024-05-08 DIAGNOSIS — C56.9 MALIGNANT NEOPLASM OF UNSPECIFIED OVARY: ICD-10-CM

## 2024-05-08 PROCEDURE — 99214 OFFICE O/P EST MOD 30 MIN: CPT

## 2024-05-08 RX ORDER — PAROXETINE 12.5 MG/1
12.5 TABLET, FILM COATED, EXTENDED RELEASE ORAL DAILY
Qty: 30 | Refills: 3 | Status: ACTIVE | COMMUNITY
Start: 2024-05-08 | End: 1900-01-01

## 2024-05-08 NOTE — PHYSICAL EXAM
[Absent] : Adnexa(ae): Absent [Normal] : Recto-Vaginal Exam: Normal [Fully active, able to carry on all pre-disease performance without restriction] : Status 0 - Fully active, able to carry on all pre-disease performance without restriction [FreeTextEntry1] : NAD [de-identified] : ALLAN [de-identified] : no edema [de-identified] : soft NT/ND [de-identified] : no masses or nodularity

## 2024-05-08 NOTE — HISTORY OF PRESENT ILLNESS
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

## 2024-05-08 NOTE — DISCUSSION/SUMMARY
[Visit Time ___ Minutes] : [unfilled] minutes [Face to Face Time___ Minutes] : with [unfilled] minutes in face to face consultation. [FreeTextEntry1] : Patient with recent Lyme disease, now recovered it seems. No gyn issues, UTD with breast screening, will reorder for this year. In the process of doing Cologuard for colon ca screening. No ca125 this year, will check again next year (23 pre op)  Return in 1 year

## 2024-08-01 ENCOUNTER — NON-APPOINTMENT (OUTPATIENT)
Age: 49
End: 2024-08-01

## 2024-08-01 PROBLEM — Z00.00 ENCOUNTER FOR PREVENTIVE HEALTH EXAMINATION: Status: ACTIVE | Noted: 2024-08-01

## 2024-08-02 ENCOUNTER — APPOINTMENT (OUTPATIENT)
Dept: CARDIOLOGY | Facility: CLINIC | Age: 49
End: 2024-08-02
Payer: COMMERCIAL

## 2024-08-02 VITALS
HEIGHT: 64 IN | SYSTOLIC BLOOD PRESSURE: 128 MMHG | BODY MASS INDEX: 39.09 KG/M2 | WEIGHT: 229 LBS | OXYGEN SATURATION: 99 % | DIASTOLIC BLOOD PRESSURE: 70 MMHG | HEART RATE: 69 BPM

## 2024-08-02 DIAGNOSIS — I10 ESSENTIAL (PRIMARY) HYPERTENSION: ICD-10-CM

## 2024-08-02 DIAGNOSIS — A78: ICD-10-CM

## 2024-08-02 DIAGNOSIS — R01.1 CARDIAC MURMUR, UNSPECIFIED: ICD-10-CM

## 2024-08-02 DIAGNOSIS — E66.9 OBESITY, UNSPECIFIED: ICD-10-CM

## 2024-08-02 RX ORDER — HYDROCHLOROTHIAZIDE 12.5 MG/1
12.5 CAPSULE ORAL
Refills: 0 | Status: ACTIVE | COMMUNITY

## 2024-08-02 RX ORDER — DOXYCYCLINE HYCLATE 100 MG/1
100 CAPSULE ORAL
Refills: 0 | Status: ACTIVE | COMMUNITY

## 2024-08-02 RX ORDER — UBIDECARENONE 200 MG
200 CAPSULE ORAL
Refills: 0 | Status: ACTIVE | COMMUNITY

## 2024-08-02 RX ORDER — ATENOLOL 100 MG/1
100 TABLET ORAL
Refills: 0 | Status: ACTIVE | COMMUNITY

## 2024-08-05 NOTE — HISTORY OF PRESENT ILLNESS
[FreeTextEntry1] : RUTHY KYLE is a 49-year-old female, with a PMHx significant for HTN, who presents today for cardiac evaluation. Patient was recently diagnosed with Q fever and referred for an echocardiogram. Denies chest pain or SOB.   Family History: (+) CAD: father.  Social History: (+) Former cigarette smoker x15 years, quit 15 years ago, (+) Vapes currently, (+) EtOH, (-) Illicit drug use.

## 2024-08-05 NOTE — CARDIOLOGY SUMMARY
[de-identified] : 08/02/2024: NSR, normal axis, normal intervals, (-) ST-T wave changes. =======================================================

## 2024-08-05 NOTE — CARDIOLOGY SUMMARY
[de-identified] : 08/02/2024: NSR, normal axis, normal intervals, (-) ST-T wave changes. =======================================================

## 2024-08-05 NOTE — DISCUSSION/SUMMARY
[EKG obtained to assist in diagnosis and management of assessed problem(s)] : EKG obtained to assist in diagnosis and management of assessed problem(s) [FreeTextEntry1] : EKG performed today was unremarkable.  Murmur: Echocardiogram performed today revealed normal LV function and normal valvular function.   HTN: The impression is hypertension. Currently, the condition is controlled. There are no changes in medication management. Continue current medical therapy. Other planned treatments include an exercise regimen, weight loss, low sodium diet, and alcohol moderation.  Obesity: Discussed risks of obesity with the patient. Counselled on weight loss with dietary modification and increased physical activity/exercise.  No further cardiac intervention warranted at the current time. Results and plan discussed with the patient.

## 2024-09-26 ENCOUNTER — OUTPATIENT (OUTPATIENT)
Dept: OUTPATIENT SERVICES | Facility: HOSPITAL | Age: 49
LOS: 1 days | End: 2024-09-26
Payer: COMMERCIAL

## 2024-09-26 DIAGNOSIS — M25.569 PAIN IN UNSPECIFIED KNEE: ICD-10-CM

## 2024-09-26 DIAGNOSIS — Z98.891 HISTORY OF UTERINE SCAR FROM PREVIOUS SURGERY: Chronic | ICD-10-CM

## 2024-09-26 DIAGNOSIS — R59.0 LOCALIZED ENLARGED LYMPH NODES: Chronic | ICD-10-CM

## 2024-09-26 PROCEDURE — 73562 X-RAY EXAM OF KNEE 3: CPT | Mod: 26,50

## 2024-09-26 PROCEDURE — 73562 X-RAY EXAM OF KNEE 3: CPT | Mod: 50

## 2024-09-27 DIAGNOSIS — M25.569 PAIN IN UNSPECIFIED KNEE: ICD-10-CM

## 2025-01-31 ENCOUNTER — OUTPATIENT (OUTPATIENT)
Dept: OUTPATIENT SERVICES | Facility: HOSPITAL | Age: 50
LOS: 1 days | End: 2025-01-31
Payer: COMMERCIAL

## 2025-01-31 ENCOUNTER — RESULT REVIEW (OUTPATIENT)
Age: 50
End: 2025-01-31

## 2025-01-31 DIAGNOSIS — Z98.891 HISTORY OF UTERINE SCAR FROM PREVIOUS SURGERY: Chronic | ICD-10-CM

## 2025-01-31 DIAGNOSIS — R59.0 LOCALIZED ENLARGED LYMPH NODES: Chronic | ICD-10-CM

## 2025-01-31 DIAGNOSIS — Z12.31 ENCOUNTER FOR SCREENING MAMMOGRAM FOR MALIGNANT NEOPLASM OF BREAST: ICD-10-CM

## 2025-01-31 DIAGNOSIS — Z13.820 ENCOUNTER FOR SCREENING FOR OSTEOPOROSIS: ICD-10-CM

## 2025-01-31 PROCEDURE — 77067 SCR MAMMO BI INCL CAD: CPT

## 2025-01-31 PROCEDURE — 77063 BREAST TOMOSYNTHESIS BI: CPT

## 2025-01-31 PROCEDURE — 77067 SCR MAMMO BI INCL CAD: CPT | Mod: 26

## 2025-01-31 PROCEDURE — 77063 BREAST TOMOSYNTHESIS BI: CPT | Mod: 26

## 2025-01-31 PROCEDURE — 77080 DXA BONE DENSITY AXIAL: CPT | Mod: 26

## 2025-01-31 PROCEDURE — 77080 DXA BONE DENSITY AXIAL: CPT

## 2025-02-01 DIAGNOSIS — Z13.820 ENCOUNTER FOR SCREENING FOR OSTEOPOROSIS: ICD-10-CM

## 2025-02-01 DIAGNOSIS — Z12.31 ENCOUNTER FOR SCREENING MAMMOGRAM FOR MALIGNANT NEOPLASM OF BREAST: ICD-10-CM

## 2025-06-09 ENCOUNTER — NON-APPOINTMENT (OUTPATIENT)
Age: 50
End: 2025-06-09

## 2025-06-10 ENCOUNTER — APPOINTMENT (OUTPATIENT)
Dept: GYNECOLOGIC ONCOLOGY | Facility: CLINIC | Age: 50
End: 2025-06-10
Payer: COMMERCIAL

## 2025-06-10 VITALS — HEIGHT: 64 IN | BODY MASS INDEX: 39.09 KG/M2 | WEIGHT: 229 LBS

## 2025-06-10 PROCEDURE — 99214 OFFICE O/P EST MOD 30 MIN: CPT

## 2025-06-10 RX ORDER — ESTRADIOL 1 MG/1
1 TABLET ORAL DAILY
Qty: 30 | Refills: 5 | Status: ACTIVE | COMMUNITY
Start: 2025-06-10 | End: 1900-01-01